# Patient Record
Sex: MALE | Race: WHITE | NOT HISPANIC OR LATINO
[De-identification: names, ages, dates, MRNs, and addresses within clinical notes are randomized per-mention and may not be internally consistent; named-entity substitution may affect disease eponyms.]

---

## 2017-08-14 ENCOUNTER — RESULT REVIEW (OUTPATIENT)
Age: 77
End: 2017-08-14

## 2019-01-27 ENCOUNTER — INPATIENT (INPATIENT)
Facility: HOSPITAL | Age: 79
LOS: 3 days | Discharge: HOME | DRG: 180 | End: 2019-01-31
Attending: FAMILY MEDICINE | Admitting: FAMILY MEDICINE
Payer: MEDICARE

## 2019-01-27 VITALS
OXYGEN SATURATION: 97 % | DIASTOLIC BLOOD PRESSURE: 60 MMHG | HEART RATE: 125 BPM | WEIGHT: 199.96 LBS | RESPIRATION RATE: 18 BRPM | TEMPERATURE: 98 F | SYSTOLIC BLOOD PRESSURE: 95 MMHG | HEIGHT: 71 IN

## 2019-01-27 DIAGNOSIS — J90 PLEURAL EFFUSION, NOT ELSEWHERE CLASSIFIED: ICD-10-CM

## 2019-01-27 DIAGNOSIS — R06.02 SHORTNESS OF BREATH: ICD-10-CM

## 2019-01-27 LAB
ALBUMIN SERPL ELPH-MCNC: 2.9 G/DL — LOW (ref 3.3–5)
ALP SERPL-CCNC: 98 U/L — SIGNIFICANT CHANGE UP (ref 40–120)
ALT FLD-CCNC: 16 U/L — SIGNIFICANT CHANGE UP (ref 12–78)
ANION GAP SERPL CALC-SCNC: 10 MMOL/L — SIGNIFICANT CHANGE UP (ref 5–17)
APTT BLD: 26.5 SEC — LOW (ref 27.5–36.3)
AST SERPL-CCNC: 13 U/L — LOW (ref 15–37)
BASOPHILS # BLD AUTO: 0.03 K/UL — SIGNIFICANT CHANGE UP (ref 0–0.2)
BASOPHILS NFR BLD AUTO: 0.2 % — SIGNIFICANT CHANGE UP (ref 0–2)
BILIRUB SERPL-MCNC: 0.5 MG/DL — SIGNIFICANT CHANGE UP (ref 0.2–1.2)
BLD GP AB SCN SERPL QL: SIGNIFICANT CHANGE UP
BUN SERPL-MCNC: 75 MG/DL — HIGH (ref 7–23)
CALCIUM SERPL-MCNC: 8.6 MG/DL — SIGNIFICANT CHANGE UP (ref 8.5–10.1)
CEA SERPL-MCNC: 24.1 NG/ML — HIGH (ref 0–3.8)
CHLORIDE SERPL-SCNC: 104 MMOL/L — SIGNIFICANT CHANGE UP (ref 96–108)
CO2 SERPL-SCNC: 23 MMOL/L — SIGNIFICANT CHANGE UP (ref 22–31)
CREAT SERPL-MCNC: 3.6 MG/DL — HIGH (ref 0.5–1.3)
EOSINOPHIL # BLD AUTO: 0.05 K/UL — SIGNIFICANT CHANGE UP (ref 0–0.5)
EOSINOPHIL NFR BLD AUTO: 0.4 % — SIGNIFICANT CHANGE UP (ref 0–6)
FLU A RESULT: SIGNIFICANT CHANGE UP
FLU A RESULT: SIGNIFICANT CHANGE UP
FLUAV AG NPH QL: SIGNIFICANT CHANGE UP
FLUBV AG NPH QL: SIGNIFICANT CHANGE UP
GLUCOSE SERPL-MCNC: 140 MG/DL — HIGH (ref 70–99)
GRAM STN FLD: SIGNIFICANT CHANGE UP
HBA1C BLD-MCNC: 5.1 % — SIGNIFICANT CHANGE UP (ref 4–5.6)
HCT VFR BLD CALC: 34.4 % — LOW (ref 39–50)
HGB BLD-MCNC: 11.4 G/DL — LOW (ref 13–17)
IMM GRANULOCYTES NFR BLD AUTO: 0.4 % — SIGNIFICANT CHANGE UP (ref 0–1.5)
INR BLD: 1.21 RATIO — HIGH (ref 0.88–1.16)
LACTATE SERPL-SCNC: 1.4 MMOL/L — SIGNIFICANT CHANGE UP (ref 0.7–2)
LACTATE SERPL-SCNC: 2.3 MMOL/L — HIGH (ref 0.7–2)
LYMPHOCYTES # BLD AUTO: 1.1 K/UL — SIGNIFICANT CHANGE UP (ref 1–3.3)
LYMPHOCYTES # BLD AUTO: 7.7 % — LOW (ref 13–44)
MCHC RBC-ENTMCNC: 30.8 PG — SIGNIFICANT CHANGE UP (ref 27–34)
MCHC RBC-ENTMCNC: 33.1 GM/DL — SIGNIFICANT CHANGE UP (ref 32–36)
MCV RBC AUTO: 93 FL — SIGNIFICANT CHANGE UP (ref 80–100)
MONOCYTES # BLD AUTO: 1.2 K/UL — HIGH (ref 0–0.9)
MONOCYTES NFR BLD AUTO: 8.5 % — SIGNIFICANT CHANGE UP (ref 2–14)
NEUTROPHILS # BLD AUTO: 11.76 K/UL — HIGH (ref 1.8–7.4)
NEUTROPHILS NFR BLD AUTO: 82.8 % — HIGH (ref 43–77)
NRBC # BLD: 0 /100 WBCS — SIGNIFICANT CHANGE UP (ref 0–0)
OB PNL STL: NEGATIVE — SIGNIFICANT CHANGE UP
PH FLD: 7.05 — SIGNIFICANT CHANGE UP
PLATELET # BLD AUTO: 368 K/UL — SIGNIFICANT CHANGE UP (ref 150–400)
POTASSIUM SERPL-MCNC: 5.1 MMOL/L — SIGNIFICANT CHANGE UP (ref 3.5–5.3)
POTASSIUM SERPL-SCNC: 5.1 MMOL/L — SIGNIFICANT CHANGE UP (ref 3.5–5.3)
PROT SERPL-MCNC: 7.4 G/DL — SIGNIFICANT CHANGE UP (ref 6–8.3)
PROTHROM AB SERPL-ACNC: 13.8 SEC — HIGH (ref 10–12.9)
RBC # BLD: 3.7 M/UL — LOW (ref 4.2–5.8)
RBC # FLD: 13.9 % — SIGNIFICANT CHANGE UP (ref 10.3–14.5)
RSV RESULT: SIGNIFICANT CHANGE UP
RSV RNA RESP QL NAA+PROBE: SIGNIFICANT CHANGE UP
SODIUM SERPL-SCNC: 137 MMOL/L — SIGNIFICANT CHANGE UP (ref 135–145)
SPECIMEN SOURCE: SIGNIFICANT CHANGE UP
T3 SERPL-MCNC: 71 NG/DL — LOW (ref 80–200)
T4 AB SER-ACNC: 4.7 UG/DL — SIGNIFICANT CHANGE UP (ref 4.6–12)
TSH SERPL-MCNC: 1.31 UIU/ML — SIGNIFICANT CHANGE UP (ref 0.36–3.74)
WBC # BLD: 14.2 K/UL — HIGH (ref 3.8–10.5)
WBC # FLD AUTO: 14.2 K/UL — HIGH (ref 3.8–10.5)

## 2019-01-27 PROCEDURE — 88108 CYTOPATH CONCENTRATE TECH: CPT | Mod: 26

## 2019-01-27 PROCEDURE — 93010 ELECTROCARDIOGRAM REPORT: CPT

## 2019-01-27 PROCEDURE — 71045 X-RAY EXAM CHEST 1 VIEW: CPT | Mod: 26

## 2019-01-27 PROCEDURE — 71045 X-RAY EXAM CHEST 1 VIEW: CPT | Mod: 26,77

## 2019-01-27 PROCEDURE — 88305 TISSUE EXAM BY PATHOLOGIST: CPT | Mod: 26

## 2019-01-27 PROCEDURE — 99285 EMERGENCY DEPT VISIT HI MDM: CPT

## 2019-01-27 PROCEDURE — 71250 CT THORAX DX C-: CPT | Mod: 26

## 2019-01-27 RX ORDER — TAMSULOSIN HYDROCHLORIDE 0.4 MG/1
0.4 CAPSULE ORAL AT BEDTIME
Qty: 0 | Refills: 0 | Status: DISCONTINUED | OUTPATIENT
Start: 2019-01-27 | End: 2019-01-31

## 2019-01-27 RX ORDER — SODIUM CHLORIDE 9 MG/ML
1000 INJECTION INTRAMUSCULAR; INTRAVENOUS; SUBCUTANEOUS ONCE
Qty: 0 | Refills: 0 | Status: COMPLETED | OUTPATIENT
Start: 2019-01-27 | End: 2019-01-27

## 2019-01-27 RX ORDER — SODIUM CHLORIDE 9 MG/ML
1000 INJECTION INTRAMUSCULAR; INTRAVENOUS; SUBCUTANEOUS
Qty: 0 | Refills: 0 | Status: DISCONTINUED | OUTPATIENT
Start: 2019-01-27 | End: 2019-01-30

## 2019-01-27 RX ORDER — TRAMADOL HYDROCHLORIDE 50 MG/1
25 TABLET ORAL THREE TIMES A DAY
Qty: 0 | Refills: 0 | Status: DISCONTINUED | OUTPATIENT
Start: 2019-01-27 | End: 2019-01-31

## 2019-01-27 RX ORDER — ALBUTEROL 90 UG/1
2 AEROSOL, METERED ORAL EVERY 6 HOURS
Qty: 0 | Refills: 0 | Status: DISCONTINUED | OUTPATIENT
Start: 2019-01-27 | End: 2019-01-31

## 2019-01-27 RX ORDER — LIDOCAINE 4 G/100G
1 CREAM TOPICAL DAILY
Qty: 0 | Refills: 0 | Status: DISCONTINUED | OUTPATIENT
Start: 2019-01-27 | End: 2019-01-31

## 2019-01-27 RX ORDER — HEPARIN SODIUM 5000 [USP'U]/ML
5000 INJECTION INTRAVENOUS; SUBCUTANEOUS EVERY 12 HOURS
Qty: 0 | Refills: 0 | Status: DISCONTINUED | OUTPATIENT
Start: 2019-01-27 | End: 2019-01-30

## 2019-01-27 RX ADMIN — SODIUM CHLORIDE 1000 MILLILITER(S): 9 INJECTION INTRAMUSCULAR; INTRAVENOUS; SUBCUTANEOUS at 18:24

## 2019-01-27 RX ADMIN — SODIUM CHLORIDE 75 MILLILITER(S): 9 INJECTION INTRAMUSCULAR; INTRAVENOUS; SUBCUTANEOUS at 20:24

## 2019-01-27 RX ADMIN — SODIUM CHLORIDE 1000 MILLILITER(S): 9 INJECTION INTRAMUSCULAR; INTRAVENOUS; SUBCUTANEOUS at 17:46

## 2019-01-27 RX ADMIN — SODIUM CHLORIDE 1000 MILLILITER(S): 9 INJECTION INTRAMUSCULAR; INTRAVENOUS; SUBCUTANEOUS at 16:46

## 2019-01-27 RX ADMIN — SODIUM CHLORIDE 1000 MILLILITER(S): 9 INJECTION INTRAMUSCULAR; INTRAVENOUS; SUBCUTANEOUS at 17:24

## 2019-01-27 RX ADMIN — TAMSULOSIN HYDROCHLORIDE 0.4 MILLIGRAM(S): 0.4 CAPSULE ORAL at 21:02

## 2019-01-27 NOTE — ED PROVIDER NOTE - OBJECTIVE STATEMENT
General
pt c/o 5 days of sob with mild nonproductive cough. pt tole by his onc that he is mildly anemic. no fevers, chills, ha, d/n/v, cp, abd pain, rectal bleeding.  pmd - patricia

## 2019-01-27 NOTE — ED ADULT NURSE NOTE - OBJECTIVE STATEMENT
a/ox4 patient a/ox4 patient sent from urgent care for dyspnea and cough x5 days. Patient tachycardic and hypotensive at this time. Monitoring patient closely on tele. Patient denies fevers, N,V,D at this time. patient recently finished meloxicam for anti-inflammatory use.

## 2019-01-27 NOTE — ED ADULT NURSE NOTE - CHIEF COMPLAINT QUOTE
Patient comes from urgent care for dyspnea, shortness of breath x 5 days associated with a dry cough. Denies fevers or chills.

## 2019-01-27 NOTE — PROCEDURE NOTE - NSPROCDETAILS_GEN_ALL_CORE
catheter inserted over needle/ultrasound assessment of effusion (size)/location identified, draped/prepped, sterile technique used, needle inserted/introduced/Seldinger technique/connection to syringe/connection to evacuated glass bottle/ultrasound assessment of effusion (localization)

## 2019-01-27 NOTE — CONSULT NOTE ADULT - SUBJECTIVE AND OBJECTIVE BOX
Date/Time Patient Seen:  		  Referring MD:   Data Reviewed	       Patient is a 78y old  Male who presents with a chief complaint of     Subjective/HPI     PAST MEDICAL & SURGICAL HISTORY:  Lung cancer  BPH (benign prostatic hyperplasia)  No significant past surgical history        Medication list         MEDICATIONS  (STANDING):  heparin  Injectable 5000 Unit(s) SubCutaneous every 12 hours  levoFLOXacin IVPB 250 milliGRAM(s) IV Intermittent every 24 hours  lidocaine   Patch 1 Patch Transdermal daily  sodium chloride 0.9%. 1000 milliLiter(s) (75 mL/Hr) IV Continuous <Continuous>  tamsulosin 0.4 milliGRAM(s) Oral at bedtime    MEDICATIONS  (PRN):  ALBUTerol    90 MICROgram(s) HFA Inhaler 2 Puff(s) Inhalation every 6 hours PRN Shortness of Breath and/or Wheezing  guaiFENesin    Syrup 100 milliGRAM(s) Oral every 6 hours PRN Cough  traMADol 25 milliGRAM(s) Oral three times a day PRN Mild Pain (1 - 3)         Vitals log        ICU Vital Signs Last 24 Hrs  T(C): 36.7 (27 Jan 2019 18:11), Max: 36.7 (27 Jan 2019 16:01)  T(F): 98.1 (27 Jan 2019 18:11), Max: 98.1 (27 Jan 2019 16:01)  HR: 102 (27 Jan 2019 18:11) (102 - 125)  BP: 117/61 (27 Jan 2019 18:11) (95/60 - 117/61)  BP(mean): --  ABP: --  ABP(mean): --  RR: 18 (27 Jan 2019 18:11) (18 - 18)  SpO2: 97% (27 Jan 2019 18:11) (97% - 97%)           Input and Output:  I&O's Detail      Lab Data                        11.4   14.20 )-----------( 368      ( 27 Jan 2019 16:48 )             34.4     01-27    137  |  104  |  75<H>  ----------------------------<  140<H>  5.1   |  23  |  3.60<H>    Ca    8.6      27 Jan 2019 16:48    TPro  7.4  /  Alb  2.9<L>  /  TBili  0.5  /  DBili  x   /  AST  13<L>  /  ALT  16  /  AlkPhos  98  01-27            Review of Systems	      Objective     Physical Examination    heart s1s2  lung dec BS  abd soft  cn grossly int  moves all extr      Pertinent Lab findings & Imaging      [place]:  NO   Adequate UO     I&O's Detail           Discussed with:     Cultures:	        Radiology    EXAM:  CT CHEST                            PROCEDURE DATE:  01/27/2019          INTERPRETATION:  Clinical information: Shortness of breath, tachycardia,   lung cancer    Axial images obtained, coronal and sagittal images computer reformatted.    Noncontrast study. No prior studies present for comparison.    A large left-sided pleural effusion is present. The left lung is   collapsed. There is mediastinal shift towards the right.    No evidence of airspace disease in the right lung. No pleural effusion. A   few linear strands are visible at the right lung base.    No pericardial effusion or thoracic aortic aneurysm. Mediastinal and   hilar regions cannot be evaluated. Central airway intact. Abnormal   appearance of the left third rib suspicious for metastatic disease.   Similar finding in the left fourth rib. Evidence of metastatic disease in     multiple lower cervical dorsal and upper lumbar vertebra.    Gallbladder contains stones. Right adrenal gland normal in appearance.    IMPRESSION: Large left-sided pleural effusion. See additional findings as   described above.                KERRI SINGLETON M.D.,ATTENDING RADIOLOGIST  This document has been electronically signed. Jan 27 2019  5:49PM

## 2019-01-27 NOTE — ED PROVIDER NOTE - CARE PLAN
Principal Discharge DX:	SOB (shortness of breath) Principal Discharge DX:	SOB (shortness of breath)  Secondary Diagnosis:	Pleural effusion

## 2019-01-27 NOTE — ED ADULT TRIAGE NOTE - CHIEF COMPLAINT QUOTE
Patient comes from urgent care for dyspnea, shortness of breath x 5 days associated with a dry cough. Denies fevers or chills. Patient comes from urgent care for dyspnea, shortness of breath x 5 days associated with a dry cough. Recently on Meloxicam for right shoulder pain. Denies fevers, chills, hematuria, or bloody stool.

## 2019-01-28 DIAGNOSIS — J91.0 MALIGNANT PLEURAL EFFUSION: ICD-10-CM

## 2019-01-28 DIAGNOSIS — Z29.9 ENCOUNTER FOR PROPHYLACTIC MEASURES, UNSPECIFIED: ICD-10-CM

## 2019-01-28 DIAGNOSIS — R06.00 DYSPNEA, UNSPECIFIED: ICD-10-CM

## 2019-01-28 DIAGNOSIS — C34.90 MALIGNANT NEOPLASM OF UNSPECIFIED PART OF UNSPECIFIED BRONCHUS OR LUNG: ICD-10-CM

## 2019-01-28 DIAGNOSIS — N40.0 BENIGN PROSTATIC HYPERPLASIA WITHOUT LOWER URINARY TRACT SYMPTOMS: ICD-10-CM

## 2019-01-28 DIAGNOSIS — N17.9 ACUTE KIDNEY FAILURE, UNSPECIFIED: ICD-10-CM

## 2019-01-28 LAB
ALBUMIN FLD-MCNC: 2.7 G/DL — SIGNIFICANT CHANGE UP
ANION GAP SERPL CALC-SCNC: 10 MMOL/L — SIGNIFICANT CHANGE UP (ref 5–17)
B PERT IGG+IGM PNL SER: SIGNIFICANT CHANGE UP
BUN SERPL-MCNC: 74 MG/DL — HIGH (ref 7–23)
CALCIUM SERPL-MCNC: 8 MG/DL — LOW (ref 8.5–10.1)
CHLORIDE SERPL-SCNC: 108 MMOL/L — SIGNIFICANT CHANGE UP (ref 96–108)
CO2 SERPL-SCNC: 23 MMOL/L — SIGNIFICANT CHANGE UP (ref 22–31)
COLOR FLD: SIGNIFICANT CHANGE UP
CREAT SERPL-MCNC: 2.6 MG/DL — HIGH (ref 0.5–1.3)
FLUID INTAKE SUBSTANCE CLASS: SIGNIFICANT CHANGE UP
FLUID SEGMENTED GRANULOCYTES: 81 % — SIGNIFICANT CHANGE UP
GLUCOSE FLD-MCNC: 16 MG/DL — SIGNIFICANT CHANGE UP
GLUCOSE SERPL-MCNC: 92 MG/DL — SIGNIFICANT CHANGE UP (ref 70–99)
HCT VFR BLD CALC: 31.5 % — LOW (ref 39–50)
HGB BLD-MCNC: 10.1 G/DL — LOW (ref 13–17)
LACTATE SERPL-SCNC: 0.9 MMOL/L — SIGNIFICANT CHANGE UP (ref 0.7–2)
LDH SERPL L TO P-CCNC: 232 U/L — SIGNIFICANT CHANGE UP (ref 50–242)
LDH SERPL L TO P-CCNC: 591 U/L — SIGNIFICANT CHANGE UP
MCHC RBC-ENTMCNC: 30.9 PG — SIGNIFICANT CHANGE UP (ref 27–34)
MCHC RBC-ENTMCNC: 32.1 GM/DL — SIGNIFICANT CHANGE UP (ref 32–36)
MCV RBC AUTO: 96.3 FL — SIGNIFICANT CHANGE UP (ref 80–100)
MONOS+MACROS # FLD: 19 % — SIGNIFICANT CHANGE UP
NIGHT BLUE STAIN TISS: SIGNIFICANT CHANGE UP
NRBC # BLD: 0 /100 WBCS — SIGNIFICANT CHANGE UP (ref 0–0)
PLATELET # BLD AUTO: 218 K/UL — SIGNIFICANT CHANGE UP (ref 150–400)
POTASSIUM SERPL-MCNC: 4.8 MMOL/L — SIGNIFICANT CHANGE UP (ref 3.5–5.3)
POTASSIUM SERPL-SCNC: 4.8 MMOL/L — SIGNIFICANT CHANGE UP (ref 3.5–5.3)
PROT FLD-MCNC: 5 G/DL — SIGNIFICANT CHANGE UP
RBC # BLD: 3.27 M/UL — LOW (ref 4.2–5.8)
RBC # FLD: 13.3 % — SIGNIFICANT CHANGE UP (ref 10.3–14.5)
RCV VOL RI: HIGH /UL (ref 0–5)
SODIUM SERPL-SCNC: 141 MMOL/L — SIGNIFICANT CHANGE UP (ref 135–145)
SPECIMEN SOURCE: SIGNIFICANT CHANGE UP
TOTAL NUCLEATED CELL COUNT, BODY FLUID: 1690 /UL — HIGH (ref 0–5)
TUBE TYPE: SIGNIFICANT CHANGE UP
WBC # BLD: 7.75 K/UL — SIGNIFICANT CHANGE UP (ref 3.8–10.5)
WBC # FLD AUTO: 7.75 K/UL — SIGNIFICANT CHANGE UP (ref 3.8–10.5)

## 2019-01-28 PROCEDURE — 32557 INSERT CATH PLEURA W/ IMAGE: CPT | Mod: LT

## 2019-01-28 PROCEDURE — 76770 US EXAM ABDO BACK WALL COMP: CPT | Mod: 26

## 2019-01-28 PROCEDURE — 93306 TTE W/DOPPLER COMPLETE: CPT | Mod: 26

## 2019-01-28 RX ORDER — INFLUENZA VIRUS VACCINE 15; 15; 15; 15 UG/.5ML; UG/.5ML; UG/.5ML; UG/.5ML
0.5 SUSPENSION INTRAMUSCULAR ONCE
Qty: 0 | Refills: 0 | Status: DISCONTINUED | OUTPATIENT
Start: 2019-01-28 | End: 2019-01-31

## 2019-01-28 RX ORDER — TAMSULOSIN HYDROCHLORIDE 0.4 MG/1
0 CAPSULE ORAL
Qty: 0 | Refills: 0 | COMMUNITY

## 2019-01-28 RX ADMIN — TAMSULOSIN HYDROCHLORIDE 0.4 MILLIGRAM(S): 0.4 CAPSULE ORAL at 21:04

## 2019-01-28 RX ADMIN — HEPARIN SODIUM 5000 UNIT(S): 5000 INJECTION INTRAVENOUS; SUBCUTANEOUS at 05:48

## 2019-01-28 RX ADMIN — SODIUM CHLORIDE 75 MILLILITER(S): 9 INJECTION INTRAMUSCULAR; INTRAVENOUS; SUBCUTANEOUS at 21:04

## 2019-01-28 RX ADMIN — HEPARIN SODIUM 5000 UNIT(S): 5000 INJECTION INTRAVENOUS; SUBCUTANEOUS at 19:30

## 2019-01-28 NOTE — PATIENT PROFILE ADULT - VISION (WITH CORRECTIVE LENSES IF THE PATIENT USUALLY WEARS THEM):
Partially impaired: cannot see medication labels or newsprint, but can see obstacles in path, and the surrounding layout; can count fingers at arm's length
baseline

## 2019-01-28 NOTE — CONSULT NOTE ADULT - PROBLEM SELECTOR RECOMMENDATION 9
pleural eff - lung ca - on tagrisso - as per heme onc PMD  thoracentesis performed in ED - 3 L drained - bloody eff  I bunny  tolerating room air  keep sat > 88 pct  fluid sent for analysis - suspicious for Malignant Pleural Effusion  on emp ABX  supportive medical regimen  tylenol for pain, discomfort PRN  will follow  discussed with patient
Do not suspect this is being driven by an acute infectious process so recommend observation off abx

## 2019-01-28 NOTE — CONSULT NOTE ADULT - SUBJECTIVE AND OBJECTIVE BOX
Patient is a 78y old  Male who presents with a chief complaint of Dyspnea (28 Jan 2019 07:59)    HPI:      PAST MEDICAL HISTORY:  Lung cancer  BPH (benign prostatic hyperplasia)      PAST SURGICAL HISTORY:  No significant past surgical history      FAMILY HISTORY:      SOCIAL HISTORY:    Allergies    erythromycin (Other)    Intolerances      Home Medications:  Lotensin HCT 20 mg-12.5 mg oral tablet: 1 tab(s) orally once a day (27 Jan 2019 18:00)  Tagrisso 80 mg oral tablet:  (27 Jan 2019 18:00)  tamsulosin 0.4 mg oral capsule: orally 2 times a day (27 Jan 2019 18:00)    MEDICATIONS  (STANDING):  heparin  Injectable 5000 Unit(s) SubCutaneous every 12 hours  levoFLOXacin IVPB 250 milliGRAM(s) IV Intermittent every 24 hours  lidocaine   Patch 1 Patch Transdermal daily  sodium chloride 0.9%. 1000 milliLiter(s) (75 mL/Hr) IV Continuous <Continuous>  tamsulosin 0.4 milliGRAM(s) Oral at bedtime    MEDICATIONS  (PRN):  ALBUTerol    90 MICROgram(s) HFA Inhaler 2 Puff(s) Inhalation every 6 hours PRN Shortness of Breath and/or Wheezing  guaiFENesin    Syrup 100 milliGRAM(s) Oral every 6 hours PRN Cough  traMADol 25 milliGRAM(s) Oral three times a day PRN Mild Pain (1 - 3)      REVIEW OF SYSTEMS:  General:   Respiratory: No cough, SOB  Cardiovascular: No CP or Palpitations	  Gastrointestinal: No nausea, Vomiting. No diarrhea  Genitourinary: No urinary complaints	  Musculoskeletal: No leg swelling, No new rash or lesions	  Neurological: 	  all other systems negative    T(F): 98 (01-28-19 @ 08:03), Max: 98.6 (01-28-19 @ 05:38)  HR: 87 (01-28-19 @ 08:03) (82 - 125)  BP: 123/63 (01-28-19 @ 08:03) (95/60 - 128/51)  RR: 17 (01-28-19 @ 08:03) (17 - 19)  SpO2: 98% (01-28-19 @ 08:03) (93% - 98%)  Wt(kg): --    PHYSICAL EXAM:  General: NAD  Respiratory: b/l air entry  Cardiovascular: S1 S2  Gastrointestinal: soft  Extremities: edema        01-28    141  |  108  |  74<H>  ----------------------------<  92  4.8   |  23  |  2.60<H>    Ca    8.0<L>      28 Jan 2019 05:19    TPro  7.4  /  Alb  2.9<L>  /  TBili  0.5  /  DBili  x   /  AST  13<L>  /  ALT  16  /  AlkPhos  98  01-27                          10.1   7.75  )-----------( 218      ( 28 Jan 2019 05:19 )             31.5       Hemoglobin: 10.1 g/dL (01-28 @ 05:19)  Hematocrit: 31.5 % (01-28 @ 05:19)  Potassium, Serum: 4.8 mmol/L (01-28 @ 05:19)  Blood Urea Nitrogen, Serum: 74 mg/dL (01-28 @ 05:19)      Creatinine, Serum: 2.60 (01-28 @ 05:19)  Creatinine, Serum: 3.60 (01-27 @ 16:48)        LIVER FUNCTIONS - ( 27 Jan 2019 16:48 )  Alb: 2.9 g/dL / Pro: 7.4 g/dL / ALK PHOS: 98 U/L / ALT: 16 U/L / AST: 13 U/L / GGT: x                       I&O's Detail    27 Jan 2019 07:01  -  28 Jan 2019 07:00  --------------------------------------------------------  IN:  Total IN: 0 mL    OUT:    Voided: 450 mL  Total OUT: 450 mL    Total NET: -450 mL            Culture - Body Fluid with Gram Stain (collected 27 Jan 2019 22:54)  Source: Pleural Fl Pleural Fluid  Gram Stain (27 Jan 2019 23:42):    polymorphonuclear leukocytes seen    No organisms seen    by cytocentrifuge Patient is a 78y old  Male who presents with a chief complaint of Dyspnea (28 Jan 2019 07:59)    HPI:   79 yo male with history of 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 He reportedly saw Dr. Duckworth about a month ago and presents now to [facility] ER with 5 day history of acute dyspnea.  In ER he underwent left thoracentesis with drainage of about 3000cc fluid. Clinically he is much improved.     Renal consult called for FAINA. Pt denies any history of kidney disease. History obtained from chart and patient. Pt seen in IR. Pt's wife at bedside.       PAST MEDICAL HISTORY:  Lung cancer  BPH (benign prostatic hyperplasia)      PAST SURGICAL HISTORY:  No significant past surgical history      FAMILY HISTORY:      SOCIAL HISTORY: + alcohol intake     Allergies    erythromycin (Other)    Intolerances      Home Medications:  Lotensin HCT 20 mg-12.5 mg oral tablet: 1 tab(s) orally once a day (27 Jan 2019 18:00)  Tagrisso 80 mg oral tablet:  (27 Jan 2019 18:00)  tamsulosin 0.4 mg oral capsule: orally 2 times a day (27 Jan 2019 18:00)    MEDICATIONS  (STANDING):  heparin  Injectable 5000 Unit(s) SubCutaneous every 12 hours  levoFLOXacin IVPB 250 milliGRAM(s) IV Intermittent every 24 hours  lidocaine   Patch 1 Patch Transdermal daily  sodium chloride 0.9%. 1000 milliLiter(s) (75 mL/Hr) IV Continuous <Continuous>  tamsulosin 0.4 milliGRAM(s) Oral at bedtime    MEDICATIONS  (PRN):  ALBUTerol    90 MICROgram(s) HFA Inhaler 2 Puff(s) Inhalation every 6 hours PRN Shortness of Breath and/or Wheezing  guaiFENesin    Syrup 100 milliGRAM(s) Oral every 6 hours PRN Cough  traMADol 25 milliGRAM(s) Oral three times a day PRN Mild Pain (1 - 3)      REVIEW OF SYSTEMS:  General: NAD  Respiratory: + SOB  Cardiovascular: No CP or Palpitations	  Gastrointestinal: Poor appetite   Genitourinary: No urinary complaints	  Musculoskeletal: No new rash or lesions		  all other systems negative    T(F): 98 (01-28-19 @ 08:03), Max: 98.6 (01-28-19 @ 05:38)  HR: 87 (01-28-19 @ 08:03) (82 - 125)  BP: 123/63 (01-28-19 @ 08:03) (95/60 - 128/51)  RR: 17 (01-28-19 @ 08:03) (17 - 19)  SpO2: 98% (01-28-19 @ 08:03) (93% - 98%)  Wt(kg): --    PHYSICAL EXAM:  General: NAD  Respiratory: b/l air entry  Cardiovascular: S1 S2  Gastrointestinal: soft  Extremities: no edema        01-28    141  |  108  |  74<H>  ----------------------------<  92  4.8   |  23  |  2.60<H>    Ca    8.0<L>      28 Jan 2019 05:19    TPro  7.4  /  Alb  2.9<L>  /  TBili  0.5  /  DBili  x   /  AST  13<L>  /  ALT  16  /  AlkPhos  98  01-27                          10.1   7.75  )-----------( 218      ( 28 Jan 2019 05:19 )             31.5       Hemoglobin: 10.1 g/dL (01-28 @ 05:19)  Hematocrit: 31.5 % (01-28 @ 05:19)  Potassium, Serum: 4.8 mmol/L (01-28 @ 05:19)  Blood Urea Nitrogen, Serum: 74 mg/dL (01-28 @ 05:19)      Creatinine, Serum: 2.60 (01-28 @ 05:19)  Creatinine, Serum: 3.60 (01-27 @ 16:48)        LIVER FUNCTIONS - ( 27 Jan 2019 16:48 )  Alb: 2.9 g/dL / Pro: 7.4 g/dL / ALK PHOS: 98 U/L / ALT: 16 U/L / AST: 13 U/L / GGT: x           I&O's Detail    27 Jan 2019 07:01  -  28 Jan 2019 07:00  --------------------------------------------------------  IN:  Total IN: 0 mL    OUT:    Voided: 450 mL  Total OUT: 450 mL    Total NET: -450 mL            Culture - Body Fluid with Gram Stain (collected 27 Jan 2019 22:54)  Source: Pleural Fl Pleural Fluid  Gram Stain (27 Jan 2019 23:42):    polymorphonuclear leukocytes seen    No organisms seen    by cytocentrifuge    < from: US Kidney and Bladder (01.28.19 @ 10:06) >  EXAM:  US KIDNEYS AND BLADDER                            PROCEDURE DATE:  01/28/2019          INTERPRETATION:  History: Acute kidney injury.    Bilateral renal ultrasound.   Left kidney 11.2 right 10.2 cm. Normal cortical volume echotexture for   age. No hydronephrosis shadowing calculus or solid/suspicious renal mass.   There are 2 simple cortical cysts in the left kidney measuring 4.0 cm,   and 0.9 cm respectively.  Bladder distended up to 200 cc with a post void residual of 71 cc. No   bladder wall thickening or abnormal intraluminal echoes.    Impression: No post renal obstruction.      JOSE JUAN CORREA M.D., ATTENDING RADIOLOGIST  This document has been electronically signed. Jan 28 2019 10:35AM    < end of copied text >

## 2019-01-28 NOTE — H&P ADULT - HISTORY OF PRESENT ILLNESS
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 He reportedly saw Dr. Duckworth about a month ago and presents now to [facility] ER with 5 day history of acute dyspnea.  In ER he underwent left thoracentesis with drainage of about 3000cc fluid. Clinically he is much improved.

## 2019-01-28 NOTE — BRIEF OPERATIVE NOTE - PROCEDURE
<<-----Click on this checkbox to enter Procedure Thoracentesis of left pleural cavity with fluoroscopic guidance  01/28/2019    Active  WFORMAN

## 2019-01-28 NOTE — CONSULT NOTE ADULT - SUBJECTIVE AND OBJECTIVE BOX
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 He reportedly saw Dr. Duckworth about a month ago and presents now to [facility] ER with 5 day history of acute dyspnea. Patient reports no fever, no chills, no productive cough.    In ER he underwent left thoracentesis with drainage of about 3000cc fluid. Clinically he is much improved. (28 Jan 2019 11:38)      PAST MEDICAL & SURGICAL HISTORY:  Lung cancer  BPH (benign prostatic hyperplasia)  No significant past surgical history      Antimicrobials  levoFLOXacin IVPB 250 milliGRAM(s) IV Intermittent every 24 hours      Immunological      Other  ALBUTerol    90 MICROgram(s) HFA Inhaler 2 Puff(s) Inhalation every 6 hours PRN  guaiFENesin    Syrup 100 milliGRAM(s) Oral every 6 hours PRN  heparin  Injectable 5000 Unit(s) SubCutaneous every 12 hours  lidocaine   Patch 1 Patch Transdermal daily  sodium chloride 0.9%. 1000 milliLiter(s) IV Continuous <Continuous>  tamsulosin 0.4 milliGRAM(s) Oral at bedtime  traMADol 25 milliGRAM(s) Oral three times a day PRN      Allergies    erythromycin (Other)    Intolerances    SOCIAL HISTORY: prior tobacco use    FAMILY HISTORY: noncontrib      ROS:    EYES:  Negative  blurry vision or double vision  GASTROINTESTINAL:  Negative for nausea, vomiting, diarrhea  -otherwise negative except for subjective    Vital Signs Last 24 Hrs  T(C): 36.7 (28 Jan 2019 08:03), Max: 37 (28 Jan 2019 05:38)  T(F): 98 (28 Jan 2019 08:03), Max: 98.6 (28 Jan 2019 05:38)  HR: 87 (28 Jan 2019 08:03) (82 - 125)  BP: 123/63 (28 Jan 2019 08:03) (95/60 - 128/51)  BP(mean): --  RR: 17 (28 Jan 2019 08:03) (17 - 19)  SpO2: 98% (28 Jan 2019 08:03) (93% - 98%)    PE:  WDWN in no distress  HEENT:  NC, PERRL, sclerae anicteric, conjunctivae clear, EOMI.  Sinuses nontender, no nasal exudate.  No buccal or pharyngeal lesions, erythema or exudate  Neck:  Supple, no adenopathy  Lungs:  No accessory muscle use, decreased BS in left base  Cor:  RRR, S1, S2, no murmur appreciated  Abd:  Symmetric, normoactive BS.  Soft, nontender, no masses, guarding or rebound.  Liver and spleen not enlarged  Extrem:  No cyanosis or edema  Skin:  No rashes.  Neuro: grossly intact  Musc: moving all limbs freely, no focal deficits    LABS:                        10.1   7.75  )-----------( 218      ( 28 Jan 2019 05:19 )             31.5       WBC Count: 7.75 K/uL (01-28-19 @ 05:19)  WBC Count: 14.20 K/uL (01-27-19 @ 16:48)      01-28    141  |  108  |  74<H>  ----------------------------<  92  4.8   |  23  |  2.60<H>    Ca    8.0<L>      28 Jan 2019 05:19    TPro  7.4  /  Alb  2.9<L>  /  TBili  0.5  /  DBili  x   /  AST  13<L>  /  ALT  16  /  AlkPhos  98  01-27      Creatinine, Serum: 2.60 mg/dL (01-28-19 @ 05:19)  Creatinine, Serum: 3.60 mg/dL (01-27-19 @ 16:48)    MICROBIOLOGY:      RADIOLOGY & ADDITIONAL STUDIES:    --< from: CT Chest No Cont (01.27.19 @ 17:36) >    EXAM:  CT CHEST                            PROCEDURE DATE:  01/27/2019          INTERPRETATION:  Clinical information: Shortness of breath, tachycardia,   lung cancer    Axial images obtained, coronal and sagittal images computer reformatted.    Noncontrast study. No prior studies present for comparison.    A large left-sided pleural effusion is present. The left lung is   collapsed. There is mediastinal shift towards the right.    No evidence of airspace disease in the right lung. No pleural effusion. A   few linear strands are visible at the right lung base.    No pericardial effusion or thoracic aortic aneurysm. Mediastinal and   hilar regions cannot be evaluated. Central airway intact. Abnormal   appearance of the left third rib suspicious for metastatic disease.   Similar finding in the left fourth rib. Evidence of metastatic disease in     multiple lower cervical dorsal and upper lumbar vertebra.    Gallbladder contains stones. Right adrenal gland normal in appearance.    IMPRESSION: Large left-sided pleural effusion. See additional findings as   described above.

## 2019-01-28 NOTE — CONSULT NOTE ADULT - SUBJECTIVE AND OBJECTIVE BOX
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 He reportedly saw Dr. Duckworth about a month ago and presents now to [facility] ER with 5 day history of acute dyspnea.  In ER he underwent left thoracentesis with drainage of about 3000cc fluid. Clinically he is much improved.   Asked to evaluate for lung cancer.    ROS:  Negative except for: dyspnea improved; cough resolved; pain in right hand/elboe due to bursitis    PAST MEDICAL & SURGICAL HISTORY:  Lung cancer  BPH (benign prostatic hyperplasia)    SOCIAL HISTORY:  former smoker; quit 1980's;  Drinks wine rarely; about once per week  retired; used to drive  Truck    FAMILY HISTORY:  non-contributory    MEDICATIONS  (STANDING):  heparin  Injectable 5000 Unit(s) SubCutaneous every 12 hours  levoFLOXacin IVPB 250 milliGRAM(s) IV Intermittent every 24 hours  lidocaine   Patch 1 Patch Transdermal daily  sodium chloride 0.9%. 1000 milliLiter(s) (75 mL/Hr) IV Continuous <Continuous>  tamsulosin 0.4 milliGRAM(s) Oral at bedtime    MEDICATIONS  (PRN):  ALBUTerol    90 MICROgram(s) HFA Inhaler 2 Puff(s) Inhalation every 6 hours PRN Shortness of Breath and/or Wheezing  guaiFENesin    Syrup 100 milliGRAM(s) Oral every 6 hours PRN Cough  traMADol 25 milliGRAM(s) Oral three times a day PRN Mild Pain (1 - 3)      Allergies: erythromycin (Other)      Vital Signs Last 24 Hrs  T(C): 36.7 (28 Jan 2019 08:03), Max: 37 (28 Jan 2019 05:38)  T(F): 98 (28 Jan 2019 08:03), Max: 98.6 (28 Jan 2019 05:38)  HR: 87 (28 Jan 2019 08:03) (82 - 125)  BP: 123/63 (28 Jan 2019 08:03) (95/60 - 128/51)  RR: 17 (28 Jan 2019 08:03) (17 - 19)  SpO2: 98% (28 Jan 2019 08:03) (93% - 98%)    PHYSICAL EXAM  General: adult in NAD  HEENT: clear oropharynx, anicteric sclera, pink conjunctivae  Neck: supple  CV: normal S1S2 with no murmur rubs or gallops  Lungs: reduced breath sounds at bilateral bases  Abdomen: soft non-tender non-distended, no hepato/splenomegaly  Ext: + stiffness and swelling in right wrist/elbow  Skin: no rashes and no petichiae  Neuro: alert and oriented X3 no focal deficits      LABS:    CBC Full  -  ( 28 Jan 2019 05:19 )  WBC Count : 7.75 K/uL  Hemoglobin : 10.1 g/dL  Hematocrit : 31.5 %  Platelet Count - Automated : 218 K/uL  Mean Cell Volume : 96.3 fl  Mean Cell Hemoglobin : 30.9 pg  Mean Cell Hemoglobin Concentration : 32.1 gm/dL    Hemoglobin: 10.1 g/dL (01-28 @ 05:19)  Hemoglobin: 11.4 g/dL (01-27 @ 16:48)    WBC Count: 7.75 K/uL (01-28 @ 05:19)  WBC Count: 14.20 K/uL (01-27 @ 16:48)      01-28    141  |  108  |  74<H>  ----------------------------<  92  4.8   |  23  |  2.60<H>    Ca    8.0<L>      28 Jan 2019 05:19    TPro  7.4  /  Alb  2.9<L>  /  TBili  0.5  /  DBili  x   /  AST  13<L>  /  ALT  16  /  AlkPhos  98  01-27    PT/INR - ( 27 Jan 2019 16:48 )   PT: 13.8 sec;   INR: 1.21 ratio    PTT - ( 27 Jan 2019 16:48 )  PTT:26.5 sec    RADIOLOGY :  < from: CT Chest No Cont (01.27.19 @ 17:36) >  INTERPRETATION:  Clinical information: Shortness of breath, tachycardia,   lung cancer    Axial images obtained, coronal and sagittal images computer reformatted.    Noncontrast study. No prior studies present for comparison.    A large left-sided pleural effusion is present. The left lung is   collapsed. There is mediastinal shift towards the right.    No evidence of airspace disease in the right lung. No pleural effusion. A   few linear strands are visible at the right lung base.    No pericardial effusion or thoracic aortic aneurysm. Mediastinal and   hilar regions cannot be evaluated. Central airway intact. Abnormal   appearance of the left third rib suspicious for metastatic disease.   Similar finding in the left fourth rib. Evidence of metastatic disease in     multiple lower cervical dorsal and upper lumbar vertebra.    Gallbladder contains stones. Right adrenal gland normal in appearance.    IMPRESSION: Large left-sided pleural effusion. See additional findings as   described above.    < end of copied text >

## 2019-01-28 NOTE — CONSULT NOTE ADULT - PROBLEM SELECTOR RECOMMENDATION 3
per oncology    Thank you for consulting us and involving us in the management of this most interesting and challenging case.     We will follow along in the care of this patient.

## 2019-01-29 LAB
ANION GAP SERPL CALC-SCNC: 8 MMOL/L — SIGNIFICANT CHANGE UP (ref 5–17)
BUN SERPL-MCNC: 53 MG/DL — HIGH (ref 7–23)
CALCIUM SERPL-MCNC: 8.2 MG/DL — LOW (ref 8.5–10.1)
CHLORIDE SERPL-SCNC: 109 MMOL/L — HIGH (ref 96–108)
CO2 SERPL-SCNC: 24 MMOL/L — SIGNIFICANT CHANGE UP (ref 22–31)
CREAT SERPL-MCNC: 1.7 MG/DL — HIGH (ref 0.5–1.3)
GLUCOSE SERPL-MCNC: 95 MG/DL — SIGNIFICANT CHANGE UP (ref 70–99)
HCT VFR BLD CALC: 30.2 % — LOW (ref 39–50)
HGB BLD-MCNC: 9.8 G/DL — LOW (ref 13–17)
MAGNESIUM SERPL-MCNC: 2.2 MG/DL — SIGNIFICANT CHANGE UP (ref 1.6–2.6)
MCHC RBC-ENTMCNC: 30.2 PG — SIGNIFICANT CHANGE UP (ref 27–34)
MCHC RBC-ENTMCNC: 32.5 GM/DL — SIGNIFICANT CHANGE UP (ref 32–36)
MCV RBC AUTO: 92.9 FL — SIGNIFICANT CHANGE UP (ref 80–100)
NRBC # BLD: 0 /100 WBCS — SIGNIFICANT CHANGE UP (ref 0–0)
PLATELET # BLD AUTO: 231 K/UL — SIGNIFICANT CHANGE UP (ref 150–400)
POTASSIUM SERPL-MCNC: 4.7 MMOL/L — SIGNIFICANT CHANGE UP (ref 3.5–5.3)
POTASSIUM SERPL-SCNC: 4.7 MMOL/L — SIGNIFICANT CHANGE UP (ref 3.5–5.3)
RBC # BLD: 3.25 M/UL — LOW (ref 4.2–5.8)
RBC # FLD: 13.6 % — SIGNIFICANT CHANGE UP (ref 10.3–14.5)
SODIUM SERPL-SCNC: 141 MMOL/L — SIGNIFICANT CHANGE UP (ref 135–145)
WBC # BLD: 8.03 K/UL — SIGNIFICANT CHANGE UP (ref 3.8–10.5)
WBC # FLD AUTO: 8.03 K/UL — SIGNIFICANT CHANGE UP (ref 3.8–10.5)

## 2019-01-29 PROCEDURE — 78306 BONE IMAGING WHOLE BODY: CPT | Mod: 26

## 2019-01-29 RX ADMIN — HEPARIN SODIUM 5000 UNIT(S): 5000 INJECTION INTRAVENOUS; SUBCUTANEOUS at 05:08

## 2019-01-29 RX ADMIN — HEPARIN SODIUM 5000 UNIT(S): 5000 INJECTION INTRAVENOUS; SUBCUTANEOUS at 18:45

## 2019-01-29 RX ADMIN — TAMSULOSIN HYDROCHLORIDE 0.4 MILLIGRAM(S): 0.4 CAPSULE ORAL at 21:09

## 2019-01-29 RX ADMIN — SODIUM CHLORIDE 75 MILLILITER(S): 9 INJECTION INTRAMUSCULAR; INTRAVENOUS; SUBCUTANEOUS at 18:45

## 2019-01-29 NOTE — GOALS OF CARE CONVERSATION - PERSONAL ADVANCE DIRECTIVE - CONVERSATION DETAILS
Met pt, feels better since placement of chest catheter. Unsure if he has hcp, await wife to visit, will consider completing hcp. contact # given

## 2019-01-30 LAB
ANION GAP SERPL CALC-SCNC: 7 MMOL/L — SIGNIFICANT CHANGE UP (ref 5–17)
BUN SERPL-MCNC: 38 MG/DL — HIGH (ref 7–23)
CALCIUM SERPL-MCNC: 8.2 MG/DL — LOW (ref 8.5–10.1)
CHLORIDE SERPL-SCNC: 108 MMOL/L — SIGNIFICANT CHANGE UP (ref 96–108)
CO2 SERPL-SCNC: 25 MMOL/L — SIGNIFICANT CHANGE UP (ref 22–31)
CREAT SERPL-MCNC: 1.5 MG/DL — HIGH (ref 0.5–1.3)
GLUCOSE SERPL-MCNC: 98 MG/DL — SIGNIFICANT CHANGE UP (ref 70–99)
HCT VFR BLD CALC: 29.1 % — LOW (ref 39–50)
HGB BLD-MCNC: 9.7 G/DL — LOW (ref 13–17)
MAGNESIUM SERPL-MCNC: 2 MG/DL — SIGNIFICANT CHANGE UP (ref 1.6–2.6)
MCHC RBC-ENTMCNC: 30.7 PG — SIGNIFICANT CHANGE UP (ref 27–34)
MCHC RBC-ENTMCNC: 33.3 GM/DL — SIGNIFICANT CHANGE UP (ref 32–36)
MCV RBC AUTO: 92.1 FL — SIGNIFICANT CHANGE UP (ref 80–100)
NRBC # BLD: 0 /100 WBCS — SIGNIFICANT CHANGE UP (ref 0–0)
PLATELET # BLD AUTO: 229 K/UL — SIGNIFICANT CHANGE UP (ref 150–400)
POTASSIUM SERPL-MCNC: 4.4 MMOL/L — SIGNIFICANT CHANGE UP (ref 3.5–5.3)
POTASSIUM SERPL-SCNC: 4.4 MMOL/L — SIGNIFICANT CHANGE UP (ref 3.5–5.3)
RBC # BLD: 3.16 M/UL — LOW (ref 4.2–5.8)
RBC # FLD: 13.2 % — SIGNIFICANT CHANGE UP (ref 10.3–14.5)
SODIUM SERPL-SCNC: 140 MMOL/L — SIGNIFICANT CHANGE UP (ref 135–145)
WBC # BLD: 7.51 K/UL — SIGNIFICANT CHANGE UP (ref 3.8–10.5)
WBC # FLD AUTO: 7.51 K/UL — SIGNIFICANT CHANGE UP (ref 3.8–10.5)

## 2019-01-30 PROCEDURE — 73552 X-RAY EXAM OF FEMUR 2/>: CPT | Mod: 26,50

## 2019-01-30 PROCEDURE — 71046 X-RAY EXAM CHEST 2 VIEWS: CPT | Mod: 26

## 2019-01-30 RX ORDER — BENAZEPRIL HYDROCHLORIDE AND HYDROCHLOROTHIAZIDE 10; 12.5 MG/1; MG/1
1 TABLET, FILM COATED ORAL
Qty: 0 | Refills: 0 | COMMUNITY

## 2019-01-30 RX ORDER — ALBUTEROL 90 UG/1
2 AEROSOL, METERED ORAL
Qty: 0 | Refills: 0 | COMMUNITY
Start: 2019-01-30

## 2019-01-30 RX ORDER — HEPARIN SODIUM 5000 [USP'U]/ML
5000 INJECTION INTRAVENOUS; SUBCUTANEOUS EVERY 8 HOURS
Qty: 0 | Refills: 0 | Status: DISCONTINUED | OUTPATIENT
Start: 2019-01-30 | End: 2019-01-31

## 2019-01-30 RX ORDER — OSIMERTINIB 80 1/1
0 TABLET, FILM COATED ORAL
Qty: 0 | Refills: 0 | COMMUNITY

## 2019-01-30 RX ADMIN — HEPARIN SODIUM 5000 UNIT(S): 5000 INJECTION INTRAVENOUS; SUBCUTANEOUS at 05:27

## 2019-01-30 RX ADMIN — TAMSULOSIN HYDROCHLORIDE 0.4 MILLIGRAM(S): 0.4 CAPSULE ORAL at 21:30

## 2019-01-30 RX ADMIN — LIDOCAINE 1 PATCH: 4 CREAM TOPICAL at 19:26

## 2019-01-30 RX ADMIN — HEPARIN SODIUM 5000 UNIT(S): 5000 INJECTION INTRAVENOUS; SUBCUTANEOUS at 17:57

## 2019-01-30 RX ADMIN — HEPARIN SODIUM 5000 UNIT(S): 5000 INJECTION INTRAVENOUS; SUBCUTANEOUS at 21:30

## 2019-01-30 RX ADMIN — LIDOCAINE 1 PATCH: 4 CREAM TOPICAL at 12:36

## 2019-01-30 NOTE — PROGRESS NOTE ADULT - ATTENDING COMMENTS
D/W patient and his wife.    Thank you for the courtesy of this referral.    I'll sign off at this time.     Fady Purdy MD  [phone]

## 2019-01-31 ENCOUNTER — TRANSCRIPTION ENCOUNTER (OUTPATIENT)
Age: 79
End: 2019-01-31

## 2019-01-31 VITALS
SYSTOLIC BLOOD PRESSURE: 127 MMHG | HEART RATE: 109 BPM | RESPIRATION RATE: 17 BRPM | OXYGEN SATURATION: 96 % | DIASTOLIC BLOOD PRESSURE: 61 MMHG | TEMPERATURE: 98 F

## 2019-01-31 LAB
ANION GAP SERPL CALC-SCNC: 8 MMOL/L — SIGNIFICANT CHANGE UP (ref 5–17)
BUN SERPL-MCNC: 36 MG/DL — HIGH (ref 7–23)
CALCIUM SERPL-MCNC: 8.4 MG/DL — LOW (ref 8.5–10.1)
CHLORIDE SERPL-SCNC: 106 MMOL/L — SIGNIFICANT CHANGE UP (ref 96–108)
CO2 SERPL-SCNC: 26 MMOL/L — SIGNIFICANT CHANGE UP (ref 22–31)
CREAT SERPL-MCNC: 1.4 MG/DL — HIGH (ref 0.5–1.3)
GLUCOSE SERPL-MCNC: 98 MG/DL — SIGNIFICANT CHANGE UP (ref 70–99)
POTASSIUM SERPL-MCNC: 4.1 MMOL/L — SIGNIFICANT CHANGE UP (ref 3.5–5.3)
POTASSIUM SERPL-SCNC: 4.1 MMOL/L — SIGNIFICANT CHANGE UP (ref 3.5–5.3)
SODIUM SERPL-SCNC: 140 MMOL/L — SIGNIFICANT CHANGE UP (ref 135–145)

## 2019-01-31 PROCEDURE — 93005 ELECTROCARDIOGRAM TRACING: CPT

## 2019-01-31 PROCEDURE — 82962 GLUCOSE BLOOD TEST: CPT

## 2019-01-31 PROCEDURE — 84443 ASSAY THYROID STIM HORMONE: CPT

## 2019-01-31 PROCEDURE — 82042 OTHER SOURCE ALBUMIN QUAN EA: CPT

## 2019-01-31 PROCEDURE — 87116 MYCOBACTERIA CULTURE: CPT

## 2019-01-31 PROCEDURE — 73552 X-RAY EXAM OF FEMUR 2/>: CPT

## 2019-01-31 PROCEDURE — C1729: CPT

## 2019-01-31 PROCEDURE — 76770 US EXAM ABDO BACK WALL COMP: CPT

## 2019-01-31 PROCEDURE — 80053 COMPREHEN METABOLIC PANEL: CPT

## 2019-01-31 PROCEDURE — 32557 INSERT CATH PLEURA W/ IMAGE: CPT

## 2019-01-31 PROCEDURE — 84480 ASSAY TRIIODOTHYRONINE (T3): CPT

## 2019-01-31 PROCEDURE — 89051 BODY FLUID CELL COUNT: CPT

## 2019-01-31 PROCEDURE — 87206 SMEAR FLUORESCENT/ACID STAI: CPT

## 2019-01-31 PROCEDURE — 71046 X-RAY EXAM CHEST 2 VIEWS: CPT

## 2019-01-31 PROCEDURE — 78306 BONE IMAGING WHOLE BODY: CPT

## 2019-01-31 PROCEDURE — 85027 COMPLETE CBC AUTOMATED: CPT

## 2019-01-31 PROCEDURE — 86900 BLOOD TYPING SEROLOGIC ABO: CPT

## 2019-01-31 PROCEDURE — 83605 ASSAY OF LACTIC ACID: CPT

## 2019-01-31 PROCEDURE — 99285 EMERGENCY DEPT VISIT HI MDM: CPT | Mod: 25

## 2019-01-31 PROCEDURE — 88108 CYTOPATH CONCENTRATE TECH: CPT

## 2019-01-31 PROCEDURE — 84157 ASSAY OF PROTEIN OTHER: CPT

## 2019-01-31 PROCEDURE — 87040 BLOOD CULTURE FOR BACTERIA: CPT

## 2019-01-31 PROCEDURE — 83036 HEMOGLOBIN GLYCOSYLATED A1C: CPT

## 2019-01-31 PROCEDURE — 86901 BLOOD TYPING SEROLOGIC RH(D): CPT

## 2019-01-31 PROCEDURE — 88305 TISSUE EXAM BY PATHOLOGIST: CPT

## 2019-01-31 PROCEDURE — 87631 RESP VIRUS 3-5 TARGETS: CPT

## 2019-01-31 PROCEDURE — C1894: CPT

## 2019-01-31 PROCEDURE — 83615 LACTATE (LD) (LDH) ENZYME: CPT

## 2019-01-31 PROCEDURE — 87102 FUNGUS ISOLATION CULTURE: CPT

## 2019-01-31 PROCEDURE — C1769: CPT

## 2019-01-31 PROCEDURE — 84436 ASSAY OF TOTAL THYROXINE: CPT

## 2019-01-31 PROCEDURE — 87015 SPECIMEN INFECT AGNT CONCNTJ: CPT

## 2019-01-31 PROCEDURE — 85610 PROTHROMBIN TIME: CPT

## 2019-01-31 PROCEDURE — 76000 FLUOROSCOPY <1 HR PHYS/QHP: CPT

## 2019-01-31 PROCEDURE — 83986 ASSAY PH BODY FLUID NOS: CPT

## 2019-01-31 PROCEDURE — 71250 CT THORAX DX C-: CPT

## 2019-01-31 PROCEDURE — 82272 OCCULT BLD FECES 1-3 TESTS: CPT

## 2019-01-31 PROCEDURE — 87205 SMEAR GRAM STAIN: CPT

## 2019-01-31 PROCEDURE — 36415 COLL VENOUS BLD VENIPUNCTURE: CPT

## 2019-01-31 PROCEDURE — 82378 CARCINOEMBRYONIC ANTIGEN: CPT

## 2019-01-31 PROCEDURE — 85730 THROMBOPLASTIN TIME PARTIAL: CPT

## 2019-01-31 PROCEDURE — 86850 RBC ANTIBODY SCREEN: CPT

## 2019-01-31 PROCEDURE — 83735 ASSAY OF MAGNESIUM: CPT

## 2019-01-31 PROCEDURE — 93306 TTE W/DOPPLER COMPLETE: CPT

## 2019-01-31 PROCEDURE — 71045 X-RAY EXAM CHEST 1 VIEW: CPT

## 2019-01-31 PROCEDURE — 82945 GLUCOSE OTHER FLUID: CPT

## 2019-01-31 PROCEDURE — 76942 ECHO GUIDE FOR BIOPSY: CPT

## 2019-01-31 PROCEDURE — 87070 CULTURE OTHR SPECIMN AEROBIC: CPT

## 2019-01-31 PROCEDURE — 80048 BASIC METABOLIC PNL TOTAL CA: CPT

## 2019-01-31 PROCEDURE — 84145 PROCALCITONIN (PCT): CPT

## 2019-01-31 PROCEDURE — A9561: CPT

## 2019-01-31 PROCEDURE — 87075 CULTR BACTERIA EXCEPT BLOOD: CPT

## 2019-01-31 RX ORDER — ALBUTEROL 90 UG/1
2 AEROSOL, METERED ORAL
Qty: 1 | Refills: 0 | OUTPATIENT
Start: 2019-01-31

## 2019-01-31 RX ORDER — LIDOCAINE 4 G/100G
1 CREAM TOPICAL
Qty: 30 | Refills: 0 | OUTPATIENT
Start: 2019-01-31 | End: 2019-03-01

## 2019-01-31 RX ADMIN — LIDOCAINE 1 PATCH: 4 CREAM TOPICAL at 00:17

## 2019-01-31 RX ADMIN — LIDOCAINE 1 PATCH: 4 CREAM TOPICAL at 11:30

## 2019-01-31 RX ADMIN — HEPARIN SODIUM 5000 UNIT(S): 5000 INJECTION INTRAVENOUS; SUBCUTANEOUS at 06:39

## 2019-01-31 NOTE — PROGRESS NOTE ADULT - PROBLEM SELECTOR PROBLEM 1
Malignant pleural effusion
Pleural effusion
Pleural effusion

## 2019-01-31 NOTE — DISCHARGE NOTE ADULT - CARE PLAN
Principal Discharge DX:	Pleural effusion  Goal:	breath better  Assessment and plan of treatment:	follow up with Lung Dr. WEIL  Secondary Diagnosis:	BPH (benign prostatic hyperplasia)  Secondary Diagnosis:	Lung cancer  Secondary Diagnosis:	FAINA (acute kidney injury)

## 2019-01-31 NOTE — PROGRESS NOTE ADULT - PROVIDER SPECIALTY LIST ADULT
Family Medicine
Infectious Disease
Internal Medicine
Nephrology
Nephrology
Pulmonology
Nephrology
Pulmonology

## 2019-01-31 NOTE — DISCHARGE NOTE ADULT - CARE PROVIDER_API CALL
Ayad Junior ()  Family Medicine  [address], [address]  [place] [address]  Phone: [phone]  Fax: [phone]    Weil, Peter A (MD)  Critical Care Medicine; Internal Medicine; Pulmonary Disease  [address], [address]  [place] [address]  Phone: [phone]  Fax: [phone]    Klaus Haley)  Hematology; Internal Medicine; Medical Oncology  [address], [address]  [place] [address]  Phone: [phone]  Fax: [phone]    Yo Varela)  Orthopaedic Surgery  [address] [place] [address]  Phone: [phone]  Fax: [phone]

## 2019-01-31 NOTE — PROGRESS NOTE ADULT - PROBLEM SELECTOR PLAN 1
lung ca with mets  malignant pleural eff  will unclamp CT this am  will monitor output  path pending  tylenol PRN for pain  I bunny  monitored off ABX - agree with ID  labs and cx reviewed  supportive medical regimen  respiratory status improved  out of bed as tolerated
MPE  path reviewed  1400 cc drained from CT left side over last 24 hrs  will check CXR this am PA and LAT  will consider DC CT if drainage tapered off  at risk for reaccumulation - will need pleurodesis if fluid returns  will follow  prognosis guarded  mets ca  known lung ca  onc following
MPE - s/p 5 L drained from left chest  CT removed  I bunny  Cr better  overall doing better and well  DC planning  at risk for recurrence of Effusion - may need Pleurodesis in the future  Onc follow up  pain assessment  tolerating room air without diff.
lung ca  on Osimertinib  s/p 3 L drained yesterday - bloody eff - likely MPE  CXR repeat shows large amount fluid residual - will arrange for IR chest tube insertion   discussed with pt this am  path and micro pending from thoracentesis, pH consistent with MPE  prognosis guarded  will follow
Malignant pleural effusion  S/P thoracentesis  S/P chest tube -- unclamped today -- monitor output  Pulmonary f/u
Malignant pleural effusion  S/P thoracentesis  S/P chest tube -- unclamped today -- monitor output  Pulmonary f/u
Management as per pulmonary/oncology

## 2019-01-31 NOTE — PROGRESS NOTE ADULT - ASSESSMENT
1.	FAINA: Prerenal azotemia, ACEI rx  2.	Lung Ca, Malignant Pleural effusion  3.	Hypertension    Improving renal indices. Off Lotensin. Avoid nephrotoxins. Monitor BP trend. Titrate BP meds as needed. Salt restriction.   S/p thoracentesis. Hematology & Pulmonary follow up. Will follow electrolytes and renal function trend.  D/c planning. To check repeat labs with PCP and out pt follow up as needed.
1.	FAINA: Prerenal azotemia, ACEI rx  2.	Lung Ca, Malignant Pleural effusion  3.	Hypertension    Improving renal indices. Off Lotensin. Avoid nephrotoxins. Will d/c IVF.   Monitor BP trend. Titrate BP meds as needed. Salt restriction.   S/p thoracentesis. Hematology & Pulmonary follow up. Will follow electrolytes and renal function trend.
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 He reportedly saw Dr. Duckworth about a month ago and presents now to [place] ER with 5 day history of acute dyspnea that is greatly improved after drainage no fever, no chills, no productive cough.    sp left thoracentesis with drainage of about 3000cc fluid followed by IR drain placement with 2000 cc more.  Cytology malignant  Cx all NTD  No concern of active/uncontrolled infection on exam
79 yo male with a history of  lung cancer, now admitted with shortness of breath and found to have pleural effusion and had  left thoracentesis with drainage of about 3000cc fluid. Clinically he is much improved now. He also had FAINA which is most likely ATN vs pre renal azotemia improving with IVF. Renal indices are much better. Spoke to family at bed side. will follow.
This is a 78 M comes with SOB
This is a 78 M comes with SOB

## 2019-01-31 NOTE — CONSULT NOTE ADULT - CONSULT REASON
FAINA
Lung Cancer with pleural effusion - C34.82, J91.0
Metastatic Bone Lesions
pleural eff  atelectasis  lung ca  dyspnea and cough
dyspnea

## 2019-01-31 NOTE — CHART NOTE - NSCHARTNOTEFT_GEN_A_CORE
Do you have Advance Directives (HCP / LV / Organ donation / Documentation of oral advance Directive):   (  x  )  yes    (      )    NO                                                                            Do you have LV - Living will :                                                                                                                                             (    )  yes    (    x  )   No    Do you have HCP - Health Care Proxy:                                                                                                                            (   x  )  yes   (       ) N0    Do you have DNR- Do Not Resuscitate :                                                                                                                           (      )  yes  (    x    )  No    Do you have DNI- Do Not intubate  :                                                                                                                               (      )  yes   (   x    ) No    Do you have MOLST - Medical orders for Life sustaining treatment  :                                                                    (      ) yes    (   x    ) No    Decision Maker :  (   x  ) Patient     (      )  HCA   (     ) Public Health Law Surrogate     (      ) Surrogate  (       ) Guardian    Goals of Care :  (   x   )   Complete Care     (       ) No Limitations                              (       )   Comfort Care       (       )  Hospice                               (      )   Limited medical Intervention / s    Medical Interventions :   (  x      )   CPR       (        )  DNR                                               (     x   )  Intubation with MV - Mechanical Ventilation  (    x  ) BIPAP/CPAP    (         )   DNI                                               (    x     )  Artificial Nutrition -  IVF, TPN / PPN, Tube Feeds             (         )   No Feeding Tube                                                (     x   ) Use Antibiotics                         (          ) No Antibiotics                                                (     x    ) Blood and Blood Products     (         )   No Blood or Blood products                                                (    x      )  Dialysis                                    (         )  No Dialysis                                                (          )  Medical Management only  (         )  No Invasive Interventions or Surgery  Time spent :                        (   x    ) up to 30 minutes                       (           )   more than 30 minutes  Goals of care by palliative care reviewed and discussed

## 2019-01-31 NOTE — CONSULT NOTE ADULT - CONSULT REQUESTED DATE/TIME
27-Jan-2019 20:05
28-Jan-2019 07:59
28-Jan-2019 10:59
31-Jan-2019 11:23
28-Jan-2019 13:13
Sinus rhythm with 1st degree AV Block. ST elevations.

## 2019-01-31 NOTE — DISCHARGE NOTE ADULT - MEDICATION SUMMARY - MEDICATIONS TO STOP TAKING
I will STOP taking the medications listed below when I get home from the hospital:    Lotensin HCT 20 mg-12.5 mg oral tablet  -- 1 tab(s) by mouth once a day

## 2019-01-31 NOTE — DISCHARGE NOTE ADULT - PATIENT PORTAL LINK FT
You can access the [facility] Patient Portal, offered by [facility], by registering with the following website: [email or web address]

## 2019-01-31 NOTE — DISCHARGE NOTE ADULT - MEDICATION SUMMARY - MEDICATIONS TO TAKE
I will START or STAY ON the medications listed below when I get home from the hospital:    tamsulosin 0.4 mg oral capsule  -- orally 2 times a day  -- Indication: For BPH (benign prostatic hyperplasia)    albuterol 90 mcg/inh inhalation aerosol  -- 2 puff(s) inhaled every 6 hours, As needed, Shortness of Breath and/or Wheezing  -- Indication: For SOB (shortness of breath)    Tagrisso 80 mg oral tablet  -- per oncology  -- Indication: For Lung cancer I will START or STAY ON the medications listed below when I get home from the hospital:    tamsulosin 0.4 mg oral capsule  -- orally 2 times a day  -- Indication: For BPH (benign prostatic hyperplasia)    albuterol 90 mcg/inh inhalation aerosol  -- 2 puff(s) inhaled every 6 hours, As needed, Shortness of Breath and/or Wheezing  -- Indication: For Shortness of breath    Tagrisso 80 mg oral tablet  -- per oncology  -- Indication: For Lung cancer I will START or STAY ON the medications listed below when I get home from the hospital:    tamsulosin 0.4 mg oral capsule  -- orally 2 times a day  -- Indication: For BPH (benign prostatic hyperplasia)    albuterol 90 mcg/inh inhalation aerosol  -- 2 puff(s) inhaled every 6 hours, As needed, Shortness of Breath and/or Wheezing  -- Indication: For Shortness of breath    Lidoderm 5% topical film  -- Apply on skin to affected area once a day   -- Indication: For Pain    Tagrisso 80 mg oral tablet  -- per oncology  -- Indication: For Lung cancer

## 2019-01-31 NOTE — CONSULT NOTE ADULT - ASSESSMENT
79 yo male with history of EGFR+ lung carcinoma followed by Dr. Duckworth as outpatient, previously progressed after Tarceva and Iressa, currently on Tagrisso since July 2018, presented to ER at [place] with symptoms of acute dyspnea over 5 days and found to have large left pleural effusion; CT evidence also with bony metastatic disease including cervical and lubar vertebral bodies and left third rib abnormalities  - s/p emergent thoracentesis with 3L removed and clinical improvement  - on IV antibiotics; however suspect that effusion is malignant; would await pathology/cytology results   - per patient, planned for additional fluid drainage later today; will discuss with pulmonary  - patient to follow up with own oncologist (Dr. Duckworth) upon discharge to determine need for change in therapy  - no acute inpatient oncology interventions necessary at this time.  - will discuss with primary team
A/P: 78M w/ Multifocal Metastatic Bone Lesions  Analgesia  DVT ppx  WBAT  PT/OT  Encourage incentive spirometry  No acute orthopedic surgical intervention indicated at this time  Ortho stable for d/c  FU with orthopedic surgeon within 1 week of discharge from hospital  Discussed with attending Dr. Varela who is in agreement with above plan. Will advise if plan changes
1.	FAINA: Prerenal azotemia, ACEI rx  2.	Lung Ca, Pleural effusion  3.	Hypertension    Improving renal indices. On IVF. Hold Lotensin. Sono results noted. Avoid nephrotoxins.   Monitor BP trend. Titrate BP meds as needed. Salt restriction. S/p thoracentesis. Hematology follow up.   Will follow electrolytes and renal function trend. D/w family at bedside.   Further recommendations pending clinical course. Thank you for the courtesy of this referral.
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 He reportedly saw Dr. Duckworth about a month ago and presents now to [place] ER with 5 day history of acute dyspnea that is greatly improved after drainge no fever, no chills, no productive cough.    sp left thoracentesis with drainage of about 3000cc fluid followed by IR drain placement with 2000 cc more.

## 2019-01-31 NOTE — DISCHARGE NOTE ADULT - CARE PROVIDERS DIRECT ADDRESSES
[email or web address]

## 2019-01-31 NOTE — DISCHARGE NOTE ADULT - HOSPITAL COURSE
admitted for dyspnea / SOB  found to have large left malignant pleural effusion  underwent bedside thoracentesis  later chest tube was inserted  also has metastatic lung ca  found to have right hip lesion  no surgical / ortho intervention  FAINA - HCTZ and ARB cleared  DC after PULM and renal clearance No

## 2019-01-31 NOTE — CONSULT NOTE ADULT - SUBJECTIVE AND OBJECTIVE BOX
Patient is a 78M community ambulator who presents today with a history of primary lung adenocarcinoma with metastasis to the bone. Patient has been followed as an outpatient by Dr. 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 He has also seen Dr. Steinberg in the past for his orthopedic care. Today, patient denies having any acute orthopedic complaints. Denies any numbness or tingling. Denies weakness or difficulty ambulating. Patient had a NM bone scan as part of his workup while in the hospitalized and was found to have multiple osseous lesions consistent with metastasis.    PAST MEDICAL & SURGICAL HISTORY:  Lung cancer  BPH (benign prostatic hyperplasia)  No significant past surgical history    Home Medications:  albuterol 90 mcg/inh inhalation aerosol: 2 puff(s) inhaled every 6 hours, As needed, Shortness of Breath and/or Wheezing (30 Jan 2019 20:42)  Tagrisso 80 mg oral tablet: per oncology (30 Jan 2019 20:42)  tamsulosin 0.4 mg oral capsule: orally 2 times a day (28 Jan 2019 14:47)    Allergies  erythromycin (Other)    PHYSICAL EXAM:  Vital Signs Last 24 Hrs  T(C): 36.7 (31 Jan 2019 08:00), Max: 37.4 (30 Jan 2019 19:51)  T(F): 98.1 (31 Jan 2019 08:00), Max: 99.4 (30 Jan 2019 19:51)  HR: 91 (31 Jan 2019 08:00) (82 - 95)  BP: 113/57 (31 Jan 2019 08:00) (106/62 - 130/74)  BP(mean): --  RR: 17 (31 Jan 2019 08:00) (16 - 18)  SpO2: 97% (31 Jan 2019 08:00) (95% - 99%)    Gen: NAD; Resting comfortably; A&O x 3  B/L LE:  Skin intact; No erythema or ecchymosis  No gross deformities appreciated  No ttp  Full painless AROM and PROM of Hip/Knee/Ankle  +EHL/FHL/TA/GSC  +DP  Compartments soft and compressible  No calf tenderness    NM Bone Scan:  Metastatic Lesions in Spine, Left 4 Ant Rib, Prox R Hum, B/l Prox Femurs, R Scapula, R Acetabulum/Pelvis

## 2019-01-31 NOTE — PROGRESS NOTE ADULT - SUBJECTIVE AND OBJECTIVE BOX
Date/Time Patient Seen:  		  Referring MD:   Data Reviewed	       Patient is a 78y old  Male who presents with a chief complaint of SOB (28 Jan 2019 13:11)      Subjective/HPI     PAST MEDICAL & SURGICAL HISTORY:  Lung cancer  BPH (benign prostatic hyperplasia)  No significant past surgical history        Medication list         MEDICATIONS  (STANDING):  heparin  Injectable 5000 Unit(s) SubCutaneous every 12 hours  influenza   Vaccine 0.5 milliLiter(s) IntraMuscular once  lidocaine   Patch 1 Patch Transdermal daily  sodium chloride 0.9%. 1000 milliLiter(s) (75 mL/Hr) IV Continuous <Continuous>  tamsulosin 0.4 milliGRAM(s) Oral at bedtime    MEDICATIONS  (PRN):  ALBUTerol    90 MICROgram(s) HFA Inhaler 2 Puff(s) Inhalation every 6 hours PRN Shortness of Breath and/or Wheezing  guaiFENesin    Syrup 100 milliGRAM(s) Oral every 6 hours PRN Cough  traMADol 25 milliGRAM(s) Oral three times a day PRN Mild Pain (1 - 3)         Vitals log        ICU Vital Signs Last 24 Hrs  T(C): 36.9 (29 Jan 2019 04:08), Max: 37.1 (28 Jan 2019 23:20)  T(F): 98.5 (29 Jan 2019 04:08), Max: 98.8 (28 Jan 2019 23:20)  HR: 89 (29 Jan 2019 04:08) (84 - 94)  BP: 113/65 (29 Jan 2019 04:08) (113/65 - 137/54)  BP(mean): 79 (28 Jan 2019 19:53) (79 - 79)  ABP: --  ABP(mean): --  RR: 17 (29 Jan 2019 04:08) (16 - 18)  SpO2: 96% (29 Jan 2019 04:08) (96% - 98%)           Input and Output:  I&O's Detail    28 Jan 2019 07:01  -  29 Jan 2019 07:00  --------------------------------------------------------  IN:    Oral Fluid: 120 mL    sodium chloride 0.9%.: 825 mL  Total IN: 945 mL    OUT:    Chest Tube: 2060 mL    Voided: 600 mL  Total OUT: 2660 mL    Total NET: -1715 mL          Lab Data                        9.8    8.03  )-----------( 231      ( 29 Jan 2019 06:15 )             30.2     01-29    141  |  109<H>  |  53<H>  ----------------------------<  95  4.7   |  24  |  1.70<H>    Ca    8.2<L>      29 Jan 2019 06:15  Mg     2.2     01-29    TPro  7.4  /  Alb  2.9<L>  /  TBili  0.5  /  DBili  x   /  AST  13<L>  /  ALT  16  /  AlkPhos  98  01-27            Review of Systems	      Objective     Physical Examination    heart s1s2  lung dec BS  chest tube left side  on room air      Pertinent Lab findings & Imaging      No:  NO   Adequate UO     I&O's Detail    28 Jan 2019 07:01  -  29 Jan 2019 07:00  --------------------------------------------------------  IN:    Oral Fluid: 120 mL    sodium chloride 0.9%.: 825 mL  Total IN: 945 mL    OUT:    Chest Tube: 2060 mL    Voided: 600 mL  Total OUT: 2660 mL    Total NET: -1715 mL               Discussed with:     Cultures:	        Radiology
Patient is a 78y old  Male who presents with a chief complaint of SOB (30 Jan 2019 07:32)      Patient seen in follow up for FAINA    PAST MEDICAL HISTORY:  Lung cancer  BPH (benign prostatic hyperplasia)    MEDICATIONS  (STANDING):  heparin  Injectable 5000 Unit(s) SubCutaneous every 8 hours  influenza   Vaccine 0.5 milliLiter(s) IntraMuscular once  lidocaine   Patch 1 Patch Transdermal daily  sodium chloride 0.9%. 1000 milliLiter(s) (75 mL/Hr) IV Continuous <Continuous>  tamsulosin 0.4 milliGRAM(s) Oral at bedtime    MEDICATIONS  (STANDING):  heparin  Injectable 5000 Unit(s) SubCutaneous every 8 hours  influenza   Vaccine 0.5 milliLiter(s) IntraMuscular once  lidocaine   Patch 1 Patch Transdermal daily  tamsulosin 0.4 milliGRAM(s) Oral at bedtime    MEDICATIONS  (PRN):  ALBUTerol    90 MICROgram(s) HFA Inhaler 2 Puff(s) Inhalation every 6 hours PRN Shortness of Breath and/or Wheezing  guaiFENesin    Syrup 100 milliGRAM(s) Oral every 6 hours PRN Cough  traMADol 25 milliGRAM(s) Oral three times a day PRN Mild Pain (1 - 3)    T(C): 36.7 (01-31-19 @ 12:00), Max: 37.4 (01-30-19 @ 19:51)  HR: 109 (01-31-19 @ 12:00) (79 - 109)  BP: 127/61 (01-31-19 @ 12:00) (106/62 - 134/76)  RR: 17 (01-31-19 @ 12:00)  SpO2: 96% (01-31-19 @ 12:00)  Wt(kg): --  I&O's Detail    30 Jan 2019 07:01  -  31 Jan 2019 07:00  --------------------------------------------------------  IN:    Oral Fluid: 200 mL    sodium chloride 0.9%: 675 mL  Total IN: 875 mL    OUT:    Voided: 1170 mL  Total OUT: 1170 mL    Total NET: -295 mL      31 Jan 2019 07:01  -  31 Jan 2019 12:50  --------------------------------------------------------  IN:  Total IN: 0 mL    OUT:    Voided: 100 mL  Total OUT: 100 mL    Total NET: -100 mL              PHYSICAL EXAM:  General: NAD  Respiratory: b/l air entry  Cardiovascular: S1 S2  Gastrointestinal: soft  Extremities:  no edema                       LABORATORY:                        9.7    7.51  )-----------( 229      ( 30 Jan 2019 06:29 )             29.1     01-31    140  |  106  |  36<H>  ----------------------------<  98  4.1   |  26  |  1.40<H>    Ca    8.4<L>      31 Jan 2019 06:52  Mg     2.0     01-30      Sodium, Serum: 140 mmol/L (01-31 @ 06:52)  Sodium, Serum: 140 mmol/L (01-30 @ 06:29)    Potassium, Serum: 4.1 mmol/L (01-31 @ 06:52)  Potassium, Serum: 4.4 mmol/L (01-30 @ 06:29)    Hemoglobin: 9.7 g/dL (01-30 @ 06:29)  Hemoglobin: 9.8 g/dL (01-29 @ 06:15)    Creatinine, Serum 1.40 (01-31 @ 06:52)  Creatinine, Serum 1.50 (01-30 @ 06:29)  Creatinine, Serum 1.70 (01-29 @ 06:15)
[facility], Division of Infectious Diseases  JEFFERY Nevarez A. Lee  [phone]    Name: FRANCESCA RENEE  Age: 78y  Gender: Male  MRN: [id number]    Interval History--  Notes reviewed. Wants to go home.  No pain except at chest tube site. No fevers, chills, or rigors. No SOB. No N/V.    Past Medical History--  Lung cancer  BPH (benign prostatic hyperplasia)  No significant past surgical history      For details regarding the patient's social history, family history, and other miscellaneous elements, please refer the initial infectious diseases consultation and/or the admitting history and physical examination for this admission.    Allergies    erythromycin (Other)    Intolerances        Medications--  Antibiotics:    Immunologic:  influenza   Vaccine 0.5 milliLiter(s) IntraMuscular once    Other:  ALBUTerol    90 MICROgram(s) HFA Inhaler PRN  guaiFENesin    Syrup PRN  heparin  Injectable  lidocaine   Patch  sodium chloride 0.9%.  tamsulosin  traMADol PRN      Review of Systems--  A 10-point review of systems was obtained.   Review of systems otherwise negative except as previously noted.    Physical Examination--  Vital Signs: T(F): 97.8 (01-30-19 @ 08:00), Max: 99.2 (01-29-19 @ 23:05)  HR: 85 (01-30-19 @ 08:00)  BP: 120/69 (01-30-19 @ 08:00)  RR: 18 (01-30-19 @ 08:00)  SpO2: 96% (01-30-19 @ 08:00)  Wt(kg): --  General: Nontoxic-appearing Male in no acute distress.  HEENT: AT/NC. PERRL. EOMI. Anicteric. Conjunctiva pink and moist. Oropharynx clear. Dentition fair.  Neck: Not rigid. No sense of mass.  Nodes: None palpable.  Lungs: Diminished breath sounds bilaterally without rales, wheezing or rhonchi  Heart: Regular rate and rhythm. No Murmur. No rub. No gallop. No palpable thrill.  Abdomen: Bowel sounds present and normoactive. Soft. Nondistended. Nontender. No sense of mass. No organomegaly.  Back: No spinal tenderness. No costovertebral angle tenderness.   Extremities: No cyanosis or clubbing. No edema.   Skin: Warm. Dry. Good turgor. No rash. No vasculitic stigmata.  Psychiatric: Appropriate affect and mood for situation.         Laboratory Studies--  CBC                        9.7    7.51  )-----------( 229      ( 30 Jan 2019 06:29 )             29.1       Chemistries  01-30    140  |  108  |  38<H>  ----------------------------<  98  4.4   |  25  |  1.50<H>    Ca    8.2<L>      30 Jan 2019 06:29  Mg     2.0     01-30        Culture Data    Culture - Fungal, Body Fluid (collected 27 Jan 2019 22:54)  Source: Pleural Fl Pleural Fluid  Preliminary Report (29 Jan 2019 07:59):    Testing in progress    Culture - Body Fluid with Gram Stain (collected 27 Jan 2019 22:54)  Source: Pleural Fl Pleural Fluid  Gram Stain (27 Jan 2019 23:42):    polymorphonuclear leukocytes seen    No organisms seen    by cytocentrifuge  Preliminary Report (28 Jan 2019 21:34):    No growth to date.    Culture - Acid Fast - Body Fluid w/Smear (collected 27 Jan 2019 22:54)  Source: Pleural Fl Pleural Fluid    Culture - Blood (collected 27 Jan 2019 19:58)  Source: .Blood Blood-Peripheral  Preliminary Report (28 Jan 2019 20:01):    No growth to date.    Culture - Blood (collected 27 Jan 2019 19:58)  Source: .Blood Blood-Peripheral  Preliminary Report (28 Jan 2019 20:01):    No growth to date.
Date/Time Patient Seen:  		  Referring MD:   Data Reviewed	       Patient is a 78y old  Male who presents with a chief complaint of     Subjective/HPI     PAST MEDICAL & SURGICAL HISTORY:  Lung cancer  BPH (benign prostatic hyperplasia)  No significant past surgical history        Medication list         MEDICATIONS  (STANDING):  heparin  Injectable 5000 Unit(s) SubCutaneous every 12 hours  levoFLOXacin IVPB 250 milliGRAM(s) IV Intermittent every 24 hours  lidocaine   Patch 1 Patch Transdermal daily  sodium chloride 0.9%. 1000 milliLiter(s) (75 mL/Hr) IV Continuous <Continuous>  tamsulosin 0.4 milliGRAM(s) Oral at bedtime    MEDICATIONS  (PRN):  ALBUTerol    90 MICROgram(s) HFA Inhaler 2 Puff(s) Inhalation every 6 hours PRN Shortness of Breath and/or Wheezing  guaiFENesin    Syrup 100 milliGRAM(s) Oral every 6 hours PRN Cough  traMADol 25 milliGRAM(s) Oral three times a day PRN Mild Pain (1 - 3)         Vitals log        ICU Vital Signs Last 24 Hrs  T(C): 37 (28 Jan 2019 05:38), Max: 37 (28 Jan 2019 05:38)  T(F): 98.6 (28 Jan 2019 05:38), Max: 98.6 (28 Jan 2019 05:38)  HR: 82 (28 Jan 2019 05:38) (82 - 125)  BP: 117/51 (28 Jan 2019 05:38) (95/60 - 128/51)  BP(mean): --  ABP: --  ABP(mean): --  RR: 19 (28 Jan 2019 05:38) (18 - 19)  SpO2: 97% (28 Jan 2019 05:38) (93% - 97%)           Input and Output:  I&O's Detail    27 Jan 2019 07:01  -  28 Jan 2019 07:00  --------------------------------------------------------  IN:  Total IN: 0 mL    OUT:    Voided: 450 mL  Total OUT: 450 mL    Total NET: -450 mL          Lab Data                        10.1   7.75  )-----------( 218      ( 28 Jan 2019 05:19 )             31.5     01-28    141  |  108  |  74<H>  ----------------------------<  92  4.8   |  23  |  2.60<H>    Ca    8.0<L>      28 Jan 2019 05:19    TPro  7.4  /  Alb  2.9<L>  /  TBili  0.5  /  DBili  x   /  AST  13<L>  /  ALT  16  /  AlkPhos  98  01-27            Review of Systems	      Objective     Physical Examination    on room air  lung dec BS  abd soft  cn grossly int      Pertinent Lab findings & Imaging      No:  NO   Adequate UO     I&O's Detail    27 Jan 2019 07:01  -  28 Jan 2019 07:00  --------------------------------------------------------  IN:  Total IN: 0 mL    OUT:    Voided: 450 mL  Total OUT: 450 mL    Total NET: -450 mL               Discussed with:     Cultures:	        Radiology
Date/Time Patient Seen:  		  Referring MD:   Data Reviewed	       Patient is a 78y old  Male who presents with a chief complaint of SOB (29 Jan 2019 13:55)      Subjective/HPI     PAST MEDICAL & SURGICAL HISTORY:  Lung cancer  BPH (benign prostatic hyperplasia)  No significant past surgical history        Medication list         MEDICATIONS  (STANDING):  heparin  Injectable 5000 Unit(s) SubCutaneous every 12 hours  influenza   Vaccine 0.5 milliLiter(s) IntraMuscular once  lidocaine   Patch 1 Patch Transdermal daily  sodium chloride 0.9%. 1000 milliLiter(s) (75 mL/Hr) IV Continuous <Continuous>  tamsulosin 0.4 milliGRAM(s) Oral at bedtime    MEDICATIONS  (PRN):  ALBUTerol    90 MICROgram(s) HFA Inhaler 2 Puff(s) Inhalation every 6 hours PRN Shortness of Breath and/or Wheezing  guaiFENesin    Syrup 100 milliGRAM(s) Oral every 6 hours PRN Cough  traMADol 25 milliGRAM(s) Oral three times a day PRN Mild Pain (1 - 3)         Vitals log        ICU Vital Signs Last 24 Hrs  T(C): 36.8 (30 Jan 2019 04:28), Max: 37.3 (29 Jan 2019 15:52)  T(F): 98.3 (30 Jan 2019 04:28), Max: 99.2 (29 Jan 2019 23:05)  HR: 89 (30 Jan 2019 04:28) (79 - 93)  BP: 111/65 (30 Jan 2019 04:28) (111/65 - 134/76)  BP(mean): --  ABP: --  ABP(mean): --  RR: 18 (30 Jan 2019 04:28) (18 - 18)  SpO2: 94% (30 Jan 2019 04:28) (94% - 97%)           Input and Output:  I&O's Detail    29 Jan 2019 07:01  -  30 Jan 2019 07:00  --------------------------------------------------------  IN:    Oral Fluid: 60 mL    sodium chloride 0.9%.: 750 mL  Total IN: 810 mL    OUT:    Voided: 400 mL  Total OUT: 400 mL    Total NET: 410 mL          Lab Data                        9.7    7.51  )-----------( 229      ( 30 Jan 2019 06:29 )             29.1     01-30    140  |  108  |  38<H>  ----------------------------<  98  4.4   |  25  |  1.50<H>    Ca    8.2<L>      30 Jan 2019 06:29  Mg     2.0     01-30              Review of Systems	      Objective     Physical Examination    heart s1s2  lung dec BS      Pertinent Lab findings & Imaging      No:  NO   Adequate UO     I&O's Detail    29 Jan 2019 07:01  -  30 Jan 2019 07:00  --------------------------------------------------------  IN:    Oral Fluid: 60 mL    sodium chloride 0.9%.: 750 mL  Total IN: 810 mL    OUT:    Voided: 400 mL  Total OUT: 400 mL    Total NET: 410 mL               Discussed with:     Cultures:	        Radiology
Date/Time Patient Seen:  		  Referring MD:   Data Reviewed	       Patient is a 78y old  Male who presents with a chief complaint of SOB (30 Jan 2019 16:00)      Subjective/HPI     PAST MEDICAL & SURGICAL HISTORY:  Lung cancer  BPH (benign prostatic hyperplasia)  No significant past surgical history        Medication list         MEDICATIONS  (STANDING):  heparin  Injectable 5000 Unit(s) SubCutaneous every 8 hours  influenza   Vaccine 0.5 milliLiter(s) IntraMuscular once  lidocaine   Patch 1 Patch Transdermal daily  tamsulosin 0.4 milliGRAM(s) Oral at bedtime    MEDICATIONS  (PRN):  ALBUTerol    90 MICROgram(s) HFA Inhaler 2 Puff(s) Inhalation every 6 hours PRN Shortness of Breath and/or Wheezing  guaiFENesin    Syrup 100 milliGRAM(s) Oral every 6 hours PRN Cough  traMADol 25 milliGRAM(s) Oral three times a day PRN Mild Pain (1 - 3)         Vitals log        ICU Vital Signs Last 24 Hrs  T(C): 36.7 (31 Jan 2019 04:33), Max: 37.4 (30 Jan 2019 19:51)  T(F): 98 (31 Jan 2019 04:33), Max: 99.4 (30 Jan 2019 19:51)  HR: 82 (31 Jan 2019 04:33) (82 - 95)  BP: 118/66 (31 Jan 2019 04:33) (106/62 - 130/74)  BP(mean): --  ABP: --  ABP(mean): --  RR: 18 (31 Jan 2019 04:33) (16 - 18)  SpO2: 99% (31 Jan 2019 04:33) (95% - 99%)           Input and Output:  I&O's Detail    30 Jan 2019 07:01  -  31 Jan 2019 07:00  --------------------------------------------------------  IN:    Oral Fluid: 200 mL    sodium chloride 0.9%: 675 mL  Total IN: 875 mL    OUT:    Voided: 1170 mL  Total OUT: 1170 mL    Total NET: -295 mL          Lab Data                        9.7    7.51  )-----------( 229      ( 30 Jan 2019 06:29 )             29.1     01-30    140  |  108  |  38<H>  ----------------------------<  98  4.4   |  25  |  1.50<H>    Ca    8.2<L>      30 Jan 2019 06:29  Mg     2.0     01-30              Review of Systems	      Objective     Physical Examination    heart s1s2  lung dec BS  abd soft      Pertinent Lab findings & Imaging      No:  NO   Adequate UO     I&O's Detail    30 Jan 2019 07:01  -  31 Jan 2019 07:00  --------------------------------------------------------  IN:    Oral Fluid: 200 mL    sodium chloride 0.9%: 675 mL  Total IN: 875 mL    OUT:    Voided: 1170 mL  Total OUT: 1170 mL    Total NET: -295 mL               Discussed with:     Cultures:	        Radiology
FRANCESCA RENEE  78y  Male    Patient is a 78y old  Male who presents with a chief complaint of SOB (29 Jan 2019 12:39)    feels much better today  on IVF  family at bed side.     PAST MEDICAL & SURGICAL HISTORY:  Lung cancer  BPH (benign prostatic hyperplasia)  No significant past surgical history    PHYSICAL EXAM:    T(C): 37 (01-29-19 @ 11:20), Max: 37.1 (01-28-19 @ 23:20)  HR: 90 (01-29-19 @ 11:20) (84 - 94)  BP: 125/73 (01-29-19 @ 11:20) (113/65 - 137/54)  RR: 18 (01-29-19 @ 11:20) (16 - 18)  SpO2: 97% (01-29-19 @ 11:20) (95% - 97%)  Wt(kg): --    I&O's Detail    28 Jan 2019 07:01  -  29 Jan 2019 07:00  --------------------------------------------------------  IN:    Oral Fluid: 120 mL    sodium chloride 0.9%.: 825 mL  Total IN: 945 mL    OUT:    Chest Tube: 2060 mL    Voided: 600 mL  Total OUT: 2660 mL    Total NET: -1715 mL      Respiratory: clear anteriorly, decreased BS at bases  Cardiovascular: S1 S2  Gastrointestinal: soft NT ND +BS  Extremities: trace edema   Neuro: Awake and alert    MEDICATIONS  (STANDING):  heparin  Injectable 5000 Unit(s) SubCutaneous every 12 hours  influenza   Vaccine 0.5 milliLiter(s) IntraMuscular once  lidocaine   Patch 1 Patch Transdermal daily  sodium chloride 0.9%. 1000 milliLiter(s) (75 mL/Hr) IV Continuous <Continuous>  tamsulosin 0.4 milliGRAM(s) Oral at bedtime    MEDICATIONS  (PRN):  ALBUTerol    90 MICROgram(s) HFA Inhaler 2 Puff(s) Inhalation every 6 hours PRN Shortness of Breath and/or Wheezing  guaiFENesin    Syrup 100 milliGRAM(s) Oral every 6 hours PRN Cough  traMADol 25 milliGRAM(s) Oral three times a day PRN Mild Pain (1 - 3)                            9.8    8.03  )-----------( 231      ( 29 Jan 2019 06:15 )             30.2       01-29    141  |  109<H>  |  53<H>  ----------------------------<  95  4.7   |  24  |  1.70<H>    Ca    8.2<L>      29 Jan 2019 06:15  Mg     2.2     01-29    TPro  7.4  /  Alb  2.9<L>  /  TBili  0.5  /  DBili  x   /  AST  13<L>  /  ALT  16  /  AlkPhos  98  01-27
Patient is a 78y old  Male who presents with a chief complaint of SOB (29 Jan 2019 07:07)      INTERVAL HPI/OVERNIGHT EVENTS: Patient seen and examined. NAD. No complaints.    Vital Signs Last 24 Hrs  T(C): 37 (29 Jan 2019 11:20), Max: 37.1 (28 Jan 2019 23:20)  T(F): 98.6 (29 Jan 2019 11:20), Max: 98.8 (28 Jan 2019 23:20)  HR: 90 (29 Jan 2019 11:20) (84 - 94)  BP: 125/73 (29 Jan 2019 11:20) (113/65 - 137/54)  BP(mean): 79 (28 Jan 2019 19:53) (79 - 79)  RR: 18 (29 Jan 2019 11:20) (16 - 18)  SpO2: 97% (29 Jan 2019 11:20) (95% - 97%)    01-29    141  |  109<H>  |  53<H>  ----------------------------<  95  4.7   |  24  |  1.70<H>    Ca    8.2<L>      29 Jan 2019 06:15  Mg     2.2     01-29    TPro  7.4  /  Alb  2.9<L>  /  TBili  0.5  /  DBili  x   /  AST  13<L>  /  ALT  16  /  AlkPhos  98  01-27                          9.8    8.03  )-----------( 231      ( 29 Jan 2019 06:15 )             30.2     PT/INR - ( 27 Jan 2019 16:48 )   PT: 13.8 sec;   INR: 1.21 ratio         PTT - ( 27 Jan 2019 16:48 )  PTT:26.5 sec  CAPILLARY BLOOD GLUCOSE      POCT Blood Glucose.: 102 mg/dL (29 Jan 2019 11:48)              ALBUTerol    90 MICROgram(s) HFA Inhaler 2 Puff(s) Inhalation every 6 hours PRN  guaiFENesin    Syrup 100 milliGRAM(s) Oral every 6 hours PRN  heparin  Injectable 5000 Unit(s) SubCutaneous every 12 hours  influenza   Vaccine 0.5 milliLiter(s) IntraMuscular once  lidocaine   Patch 1 Patch Transdermal daily  sodium chloride 0.9%. 1000 milliLiter(s) IV Continuous <Continuous>  tamsulosin 0.4 milliGRAM(s) Oral at bedtime  traMADol 25 milliGRAM(s) Oral three times a day PRN              REVIEW OF SYSTEMS:  CONSTITUTIONAL: No fever, no weight loss, or no fatigue  NECK: No pain, no stiffness  RESPIRATORY: No cough, no wheezing, no chills, no hemoptysis, No shortness of breath  CARDIOVASCULAR: No chest pain, no palpitations, no dizziness, no leg swelling  GASTROINTESTINAL: No abdominal pain. No nausea, no vomiting, no hematemesis; No diarrhea, no constipation. No melena, no hematochezia.  GENITOURINARY: No dysuria, no frequency, no hematuria, no incontinence  NEUROLOGICAL: No headaches, no loss of strength, no numbness, no tremors  SKIN: No itching, no burning  MUSCULOSKELETAL: No joint pain, no swelling; No muscle, no back, no extremity pain  PSYCHIATRIC: No depression, no mood swings,   HEME/LYMPH: No easy bruising, no bleeding gums  ALLERY AND IMMUNOLOGIC: No hives       Consultant(s) Notes Reviewed:  [X] YES  [ ] NO    PHYSICAL EXAM:  GENERAL: NAD  HEAD:  Atraumatic, Normocephalic  EYES: EOMI, PERRLA, conjunctiva and sclera clear  ENMT: No tonsillar erythema, exudates, or enlargement; Moist mucous membranes  NECK: Supple, No JVD  NERVOUS SYSTEM:  Awake & alert  CHEST/LUNG: Clear to auscultation bilaterally; No rales, rhonchi, wheezing,  HEART: Regular rate and rhythm  ABDOMEN: Soft, Nontender, Nondistended; Bowel sounds present  EXTREMITIES:  No clubbing, cyanosis, or edema  LYMPH: No lymphadenopathy noted  SKIN: No rashes      Advanced care planning discussed with patient/family [X] YES   [ ] NO    Advanced care planning discussed with patient/family. Advanced care planning forms reviewed/discussed/completed. 20 minutes spent.
Patient is a 78y old  Male who presents with a chief complaint of SOB (30 Jan 2019 07:32)      Patient seen in follow up for     PAST MEDICAL HISTORY:  Lung cancer  BPH (benign prostatic hyperplasia)    MEDICATIONS  (STANDING):  heparin  Injectable 5000 Unit(s) SubCutaneous every 8 hours  influenza   Vaccine 0.5 milliLiter(s) IntraMuscular once  lidocaine   Patch 1 Patch Transdermal daily  sodium chloride 0.9%. 1000 milliLiter(s) (75 mL/Hr) IV Continuous <Continuous>  tamsulosin 0.4 milliGRAM(s) Oral at bedtime    MEDICATIONS  (PRN):  ALBUTerol    90 MICROgram(s) HFA Inhaler 2 Puff(s) Inhalation every 6 hours PRN Shortness of Breath and/or Wheezing  guaiFENesin    Syrup 100 milliGRAM(s) Oral every 6 hours PRN Cough  traMADol 25 milliGRAM(s) Oral three times a day PRN Mild Pain (1 - 3)    T(C): 37.1 (01-30-19 @ 11:43), Max: 37.3 (01-29-19 @ 15:52)  HR: 91 (01-30-19 @ 11:43) (79 - 93)  BP: 118/72 (01-30-19 @ 11:43) (111/65 - 134/76)  RR: 18 (01-30-19 @ 11:43) (17 - 18)  SpO2: 97% (01-30-19 @ 11:43) (94% - 97%)  Wt(kg): --  I&O's Detail    29 Jan 2019 07:01  -  30 Jan 2019 07:00  --------------------------------------------------------  IN:    Oral Fluid: 60 mL    sodium chloride 0.9%.: 750 mL  Total IN: 810 mL    OUT:    Voided: 400 mL  Total OUT: 400 mL    Total NET: 410 mL      30 Jan 2019 07:01  -  30 Jan 2019 15:30  --------------------------------------------------------  IN:    Oral Fluid: 100 mL  Total IN: 100 mL    OUT:    Voided: 320 mL  Total OUT: 320 mL    Total NET: -220 mL          PHYSICAL EXAM:  General: NAD  Respiratory: b/l air entry, + chest tube  Cardiovascular: S1 S2  Gastrointestinal: soft  Extremities:  no edema                          9.7    7.51  )-----------( 229      ( 30 Jan 2019 06:29 )             29.1     01-30    140  |  108  |  38<H>  ----------------------------<  98  4.4   |  25  |  1.50<H>    Ca    8.2<L>      30 Jan 2019 06:29  Mg     2.0     01-30                Sodium, Serum: 140 (01-30 @ 06:29)  Sodium, Serum: 141 (01-29 @ 06:15)  Sodium, Serum: 141 (01-28 @ 05:19)  Sodium, Serum: 137 (01-27 @ 16:48)    Creatinine, Serum: 1.50 (01-30 @ 06:29)  Creatinine, Serum: 1.70 (01-29 @ 06:15)  Creatinine, Serum: 2.60 (01-28 @ 05:19)  Creatinine, Serum: 3.60 (01-27 @ 16:48)    Potassium, Serum: 4.4 (01-30 @ 06:29)  Potassium, Serum: 4.7 (01-29 @ 06:15)  Potassium, Serum: 4.8 (01-28 @ 05:19)  Potassium, Serum: 5.1 (01-27 @ 16:48)    Hemoglobin: 9.7 (01-30 @ 06:29)  Hemoglobin: 9.8 (01-29 @ 06:15)  Hemoglobin: 10.1 (01-28 @ 05:19)  Hemoglobin: 11.4 (01-27 @ 16:48)
Patient is a 78y old  Male who presents with a chief complaint of SOB (30 Jan 2019 15:29)      INTERVAL /OVERNIGHT EVENTS: feels better, denies SOB    MEDICATIONS  (STANDING):  heparin  Injectable 5000 Unit(s) SubCutaneous every 8 hours  influenza   Vaccine 0.5 milliLiter(s) IntraMuscular once  lidocaine   Patch 1 Patch Transdermal daily  tamsulosin 0.4 milliGRAM(s) Oral at bedtime    MEDICATIONS  (PRN):  ALBUTerol    90 MICROgram(s) HFA Inhaler 2 Puff(s) Inhalation every 6 hours PRN Shortness of Breath and/or Wheezing  guaiFENesin    Syrup 100 milliGRAM(s) Oral every 6 hours PRN Cough  traMADol 25 milliGRAM(s) Oral three times a day PRN Mild Pain (1 - 3)      Allergies    erythromycin (Other)    Intolerances        REVIEW OF SYSTEMS:  CONSTITUTIONAL: No fever, weight loss, or fatigue  EYES: No eye pain, visual disturbances, or discharge  ENMT:  No difficulty hearing, tinnitus, vertigo; No sinus or throat pain  NECK: No pain or stiffness  RESPIRATORY: No cough, wheezing, chills or hemoptysis; No shortness of breath  CARDIOVASCULAR: No chest pain, palpitations, dizziness, or leg swelling  GASTROINTESTINAL: No abdominal or epigastric pain. No nausea, vomiting, or hematemesis; No diarrhea or constipation. No melena or hematochezia.  GENITOURINARY: No dysuria, frequency, hematuria, or incontinence  NEUROLOGICAL: No headaches, memory loss, loss of strength, numbness, or tremors  SKIN: No itching, burning, rashes, or lesions   LYMPH NODES: No enlarged glands  ENDOCRINE: No heat or cold intolerance; No hair loss; No polydipsia or polyuria  MUSCULOSKELETAL: No joint pain or swelling; No muscle, back, or extremity pain  PSYCHIATRIC: No depression, anxiety, mood swings, or difficulty sleeping  HEME/LYMPH: No easy bruising, or bleeding gums  ALLERGY AND IMMUNOLOGIC: No hives or eczema    Vital Signs Last 24 Hrs  T(C): 37.1 (30 Jan 2019 11:43), Max: 37.3 (29 Jan 2019 23:05)  T(F): 98.8 (30 Jan 2019 11:43), Max: 99.2 (29 Jan 2019 23:05)  HR: 91 (30 Jan 2019 11:43) (79 - 93)  BP: 118/72 (30 Jan 2019 11:43) (111/65 - 120/69)  BP(mean): --  RR: 18 (30 Jan 2019 11:43) (18 - 18)  SpO2: 97% (30 Jan 2019 11:43) (94% - 97%)    PHYSICAL EXAM:  GENERAL: NAD, well-groomed, well-developed  HEAD:  Atraumatic, Normocephalic  EYES: EOMI, PERRLA, conjunctiva and sclera clear  ENMT: No tonsillar erythema, exudates, or enlargement; Moist mucous membranes, Good dentition, No lesions  NECK: Supple, No JVD, Normal thyroid  NERVOUS SYSTEM:  Alert & Oriented X3, Good concentration; Motor Strength 5/5 B/L upper and lower extremities; DTRs 2+ intact and symmetric  CHEST/LUNG: Clear to auscultation bilaterally; No rales, rhonchi, wheezing, or rubs  HEART: Regular rate and rhythm; No murmurs, rubs, or gallops  ABDOMEN: Soft, Nontender, Nondistended; Bowel sounds present  EXTREMITIES:  2+ Peripheral Pulses, No clubbing, cyanosis, or edema  LYMPH: No lymphadenopathy noted  SKIN: No rashes or lesions    LABS:                        9.7    7.51  )-----------( 229      ( 30 Jan 2019 06:29 )             29.1     30 Jan 2019 06:29    140    |  108    |  38     ----------------------------<  98     4.4     |  25     |  1.50     Ca    8.2        30 Jan 2019 06:29  Mg     2.0       30 Jan 2019 06:29          CAPILLARY BLOOD GLUCOSE          RADIOLOGY & ADDITIONAL TESTS:    Notes Reviewed:  [x ] YES  [ ] NO    Care Discussed with Consultants/Other Providers [x ] YES  [ ] NO

## 2019-02-01 LAB
CULTURE RESULTS: SIGNIFICANT CHANGE UP
SPECIMEN SOURCE: SIGNIFICANT CHANGE UP

## 2019-02-14 PROBLEM — Z00.00 ENCOUNTER FOR PREVENTIVE HEALTH EXAMINATION: Status: ACTIVE | Noted: 2019-02-14

## 2019-02-15 ENCOUNTER — APPOINTMENT (OUTPATIENT)
Dept: THORACIC SURGERY | Facility: CLINIC | Age: 79
End: 2019-02-15

## 2019-02-15 PROBLEM — N40.0 BENIGN PROSTATIC HYPERPLASIA WITHOUT LOWER URINARY TRACT SYMPTOMS: Chronic | Status: ACTIVE | Noted: 2019-01-27

## 2019-02-15 PROBLEM — C34.90 MALIGNANT NEOPLASM OF UNSPECIFIED PART OF UNSPECIFIED BRONCHUS OR LUNG: Chronic | Status: ACTIVE | Noted: 2019-01-27

## 2019-02-20 ENCOUNTER — APPOINTMENT (OUTPATIENT)
Dept: THORACIC SURGERY | Facility: CLINIC | Age: 79
End: 2019-02-20
Payer: MEDICARE

## 2019-02-20 VITALS
OXYGEN SATURATION: 96 % | WEIGHT: 185 LBS | DIASTOLIC BLOOD PRESSURE: 85 MMHG | HEIGHT: 71 IN | BODY MASS INDEX: 25.9 KG/M2 | HEART RATE: 100 BPM | TEMPERATURE: 98.1 F | SYSTOLIC BLOOD PRESSURE: 149 MMHG | RESPIRATION RATE: 18 BRPM

## 2019-02-20 DIAGNOSIS — C79.51 SECONDARY MALIGNANT NEOPLASM OF BONE: ICD-10-CM

## 2019-02-20 DIAGNOSIS — Z86.79 PERSONAL HISTORY OF OTHER DISEASES OF THE CIRCULATORY SYSTEM: ICD-10-CM

## 2019-02-20 DIAGNOSIS — Z78.9 OTHER SPECIFIED HEALTH STATUS: ICD-10-CM

## 2019-02-20 DIAGNOSIS — M84.479A: ICD-10-CM

## 2019-02-20 DIAGNOSIS — C34.92 MALIGNANT NEOPLASM OF UNSPECIFIED PART OF LEFT BRONCHUS OR LUNG: ICD-10-CM

## 2019-02-20 DIAGNOSIS — Z87.891 PERSONAL HISTORY OF NICOTINE DEPENDENCE: ICD-10-CM

## 2019-02-20 DIAGNOSIS — J90 PLEURAL EFFUSION, NOT ELSEWHERE CLASSIFIED: ICD-10-CM

## 2019-02-20 PROCEDURE — 99205 OFFICE O/P NEW HI 60 MIN: CPT

## 2019-02-21 PROBLEM — Z78.9 NO SIGNIFICANT FAMILY HISTORY: Status: ACTIVE | Noted: 2019-02-21

## 2019-02-21 PROBLEM — C79.51 METASTATIC ADENOCARCINOMA TO BONE: Status: ACTIVE | Noted: 2019-02-21

## 2019-02-21 PROBLEM — Z87.891 FORMER SMOKER: Status: ACTIVE | Noted: 2019-02-21

## 2019-02-21 PROBLEM — M84.479A METATARSAL FRACTURE, PATHOLOGIC: Status: ACTIVE | Noted: 2019-02-21

## 2019-02-21 PROBLEM — J90 PLEURAL EFFUSION: Status: ACTIVE | Noted: 2019-02-21

## 2019-02-21 PROBLEM — C34.92 ADENOCARCINOMA OF LEFT LUNG: Status: ACTIVE | Noted: 2019-02-21

## 2019-02-21 PROBLEM — Z86.79 HISTORY OF HYPERTENSION: Status: RESOLVED | Noted: 2019-02-21 | Resolved: 2019-02-21

## 2019-02-21 RX ORDER — BENAZEPRIL HYDROCHLORIDE AND HYDROCHLOROTHIAZIDE 20; 12.5 MG/1; MG/1
20-12.5 TABLET, FILM COATED ORAL DAILY
Refills: 0 | Status: ACTIVE | COMMUNITY

## 2019-02-21 RX ORDER — OSIMERTINIB 80 1/1
80 TABLET, FILM COATED ORAL DAILY
Refills: 0 | Status: ACTIVE | COMMUNITY

## 2019-02-22 ENCOUNTER — EMERGENCY (EMERGENCY)
Facility: HOSPITAL | Age: 79
LOS: 1 days | End: 2019-02-22
Attending: EMERGENCY MEDICINE
Payer: MEDICARE

## 2019-02-22 PROCEDURE — 99285 EMERGENCY DEPT VISIT HI MDM: CPT | Mod: 25

## 2019-02-22 PROCEDURE — 92950 HEART/LUNG RESUSCITATION CPR: CPT

## 2019-02-22 NOTE — ED ADULT NURSE REASSESSMENT NOTE - NS ED NURSE REASSESS COMMENT FT1
pt was biba from home in cardiac arrest, no rhythm, no shocks, PEA. refer to code sheet. pronounced dead at 2507

## 2019-02-22 NOTE — CONSULT LETTER
[Dear  ___] : Dear  [unfilled], [Courtesy Letter:] : I had the pleasure of seeing your patient, [unfilled], in my office today. [Please see my note below.] : Please see my note below. [Sincerely,] : Sincerely, [FreeTextEntry2] : Nina Ford MD

## 2019-02-22 NOTE — DATA REVIEWED
[FreeTextEntry1] : CT scan in 1/2019 revealed enlarging bone mets with sclerotic lesions in the spine, pelvis, right iliac creast and L2 region, with CHELLE lesion grew to 3.8 cm ( previously 3cm in 5/2018 and 2.9cm in 8/2017). Anew moderate left effusion. \par \par Bone scan on 1/14/19: activity in the right proximal humerus, left anterior rib, thoracolumbar spine and left mid shaft of the femur. \par

## 2019-02-22 NOTE — ED PROVIDER NOTE - PROGRESS NOTE DETAILS
Dw pts wife at bedside, in ed. Cachorro Tucker, will need to dw Dr Wheeler. Cachorro Jimenez, [phone] - states will hve Dr Wheeler sign death certif. Cachorro DOWD, Deceglia - will release case to family.

## 2019-02-22 NOTE — HISTORY OF PRESENT ILLNESS
[FreeTextEntry1] : 79 y/o male, former smoker, with Pmhx of HTN, lung cancer, is referred by oncologist Dr. Nina Ford for evaluation of pleural effusion. 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 He was started on Tarceva in 9/2017, but d/c later due to severe skin rash, currently on Tagrisson 80 mg QD started from 7/2018. \par \par CT scan in 1/2019 revealed enlarging bone mets with sclerotic lesions in the spine, pelvis, right iliac creast and L2 region, with CHELLE lesion grew to 3.8 cm ( previously 3cm in 5/2018 and 2.9cm in 8/2017). Anew moderate left effusion. \par Bone scan on 1/14/19: activity in the right proximal humerus, left anterior rib, thoracolumbar spine and left mid shaft of the femur. \par \par He was hospitalized on 1/28/19 for acute dyspnea, found to have enlarging and symptomatic left pleural effusion. He underwent left thoracentesis with cytology c/w malignancy. \par \par \par CT chest on 1/27/19: large left sided pleural effusion.\par He was found to have transverse non-displaced fx of the right humerus neck c/w with mets. \par \par He presents today for evaluation of pleural effusion. He admits SOB on exertion. The patient denies cough, chest pain, hemoptysis, palpitation, fever, recent illness, hospitalization and significant weight loss.\par \par \par

## 2019-02-22 NOTE — ED PROVIDER NOTE - ENMT, MLM
Airway patent, Nasal mucosa clear. Mouth with normal mucosa. Throat has no vesicles, no oropharyngeal exudates and uvula is midline. MM moist

## 2019-02-22 NOTE — PHYSICAL EXAM
[General Appearance - Alert] : alert [General Appearance - In No Acute Distress] : in no acute distress [Sclera] : the sclera and conjunctiva were normal [PERRL With Normal Accommodation] : pupils were equal in size, round, and reactive to light [Outer Ear] : the ears and nose were normal in appearance [Hearing Threshold Finger Rub Not [place]] : hearing was normal [Neck Appearance] : the appearance of the neck was normal [Respiration, Rhythm And Depth] : normal respiratory rhythm and effort [Auscultation Breath Sounds / Voice Sounds] : lungs were clear to auscultation bilaterally [Heart Rate And Rhythm] : heart rate was normal and rhythm regular [Heart Sounds] : normal S1 and S2 [Examination Of The Chest] : the chest was normal in appearance [2+] : left 2+ [No Abnormalities] : the abdominal aorta was not enlarged and no bruit was heard [No Pulse Delay] : no pulse delay [No Pulse Discrepancy] : no pulse discrepancy detected [Full Pulse] : peripheral pulses were full [Bowel Sounds] : normal bowel sounds [Abdomen Soft] : soft [Cervical Lymph Nodes Enlarged Posterior Bilaterally] : posterior cervical [Cervical Lymph Nodes Enlarged Anterior Bilaterally] : anterior cervical [No CVA Tenderness] : no ~M costovertebral angle tenderness [Abnormal Walk] : normal gait [Involuntary Movements] : no involuntary movements were seen [Skin Color & Pigmentation] : normal skin color and pigmentation [Skin Turgor] : normal skin turgor [] : no rash [No Focal Deficits] : no focal deficits [Oriented To Time, Place, And Person] : oriented to person, place, and time [Affect] : the affect was normal [Mood] : the mood was normal [Fingers] :  capillary refill of the fingers was normal [Varicose Veins Of The Right Leg] : the patient has no varicose veins of the right leg [Varicose Veins Of The Left Leg] : the patient has no varicose veins of the left leg [FreeTextEntry1] : deferred

## 2019-02-22 NOTE — ASSESSMENT
[FreeTextEntry1] : 77 y/o male, former smoker, with Pmhx of HTN, lung cancer, is referred by oncologist Dr. Nina Ford for evaluation of pleural effusion. 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 He was started on Tarceva in 9/2017, but d/c later due to severe skin rash, currently on Tagrisson 80 mg QD started from 7/2018. \par \par CT scan in 1/2019 revealed enlarging bone mets with sclerotic lesions in the spine, pelvis, right iliac creast and L2 region, with CHELLE lesion grew to 3.8 cm ( previously 3cm in 5/2018 and 2.9cm in 8/2017). A new moderate left effusion. \par Bone scan on 1/14/19: activity in the right proximal humerus, left anterior rib, thoracolumbar spine and left mid shaft of the femur. \par \par CT chest on 1/27/19: large left sided pleural effusion. \par He was hospitalized on 1/28/19 for acute dyspnea, found to have enlarging and symptomatic left pleural effusion. He underwent left thoracentesis with cytology c/w malignancy. \par \par He was found to have transverse non-displaced fx of the right humerus neck c/w with mets. \par \par I have reviewed the patient's medical records and diagnostic images at the time of this office consultation and have made the following recommendation.\par Plan:\par 1. Left VATS, drainage of left pleural effusion, Pleurx catheter placement on 2/26/19.  All risks vs. benefits and alternatives were explained to the patient, all questions were answered, patient verbalized understanding, was in agreement with the plan to proceed.\par 2. Medical clearance. \par 3. PST. \par \par \par \par \par Written by Alexandra Vargas NP, acting as a scribe for Dr. Roberto Noland.       \par “The documentation recorded by the scribe accurately reflects the service I personally performed and the decisions made by me.”   Roberto Noland MD.\par \par \par \par

## 2019-02-26 ENCOUNTER — APPOINTMENT (OUTPATIENT)
Dept: THORACIC SURGERY | Facility: HOSPITAL | Age: 79
End: 2019-02-26

## 2019-02-27 LAB
CULTURE RESULTS: SIGNIFICANT CHANGE UP
SPECIMEN SOURCE: SIGNIFICANT CHANGE UP

## 2019-03-20 LAB
CULTURE RESULTS: SIGNIFICANT CHANGE UP
SPECIMEN SOURCE: SIGNIFICANT CHANGE UP

## 2019-12-23 NOTE — ED PROVIDER NOTE - WILL SEE PATIENT
Patients daughter called regarding antibiotic therapy. She states that she was not aware that her mother needed to complete the Azithromycin along with the omnicef. She states that her mother is feeling better today and that she will add the antibiotic this afternoon to her medications.    today

## 2021-04-11 NOTE — ED ADULT TRIAGE NOTE - NS ED TRIAGE AVPU SCALE
No restrictions  
Alert-The patient is alert, awake and responds to voice. The patient is oriented to time, place, and person. The triage nurse is able to obtain subjective information.

## 2022-03-17 NOTE — ED PROVIDER NOTE - OBJECTIVE STATEMENT
77 yo M p/w was co SOB this am. Wife called EMS. Upon arrival of EMS, pt was found in cardiac arrest. Pt was asystolic, no shocks. on / off PEA. no trauma. No fever/chills. pt with recent admission for malignant pleural eff. No other hx avail
room air

## 2022-06-17 NOTE — H&P ADULT - NS NEC GEN PE MLT EXAM PC
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 Pt reported his girlfriend manages cooking, cleaning, laundry, grocery shopping. Pt not actively driving but was able to prior to 2022.    ST Dx: [] Aphasia  [x] Dysarthria  [] Apraxia   [x] Oropharyngeal dysphagia [x] Cognitive Impairment  [] Other:   Date of Admit: 2022  Room #: 0156/0156-01    Current functional status (updated daily):         Pt being seen for : [x] Speech/Language Treatment  [x] Dysphagia Treatment [x] Cognitive Treatment  [] Other:  Communication: []WFL  [] Aphasia  [x] Dysarthria  [] Apraxia  [] Pragmatic Impairment [] Non-verbal  [] Hearing Loss  [] Other:   Cognition: [] WFL  [] Mild  [x] Moderate  [] Severe [] Unable to Assess  [] Other:  Memory: [] WFL  [] Mild  [x] Moderate  [] Severe [] Unable to Assess  [] Other:  Behavior: [x] Alert  [x] Cooperative  [x]  Pleasant  [] Confused  [] Agitated  [] Uncooperative  [] Distractible [] Motivated  [] Self-Limiting [] Anxious  [] Other:  Endurance:  [x] Adequate for participation in SLP sessions  [] Reduced overall  [] Lethargic  [] Other:  Safety: [] No concerns at this time  [x] Reduced insight into deficits  [x]  Reduced safety awareness [] Not following call light procedures  [] Unable to Assess  [] Other:    Current Diet Order:Diet NPO  ADULT TUBE FEEDING; PEG; Standard with Fiber; Bolus; 5 Times Daily; 237; 60; Before and after each bolus; Protein; 1 bottle of Proteinex daily, 30 mL free water flush before and after adminstration  Swallowing Precautions: oral care 2-3x/day to reduce adverse affects in the event of aspiration        Date: 6/17/2022      Tx session 1  0900 - 1000 Tx session 2  All tx needs met in session 1   Total Timed Code Min 0 0   Total Treatment Minutes 60 0   Individual Treatment Minutes 60 0   Group Treatment Minutes 0 0   Co-Treat Minutes 0 0   Variance/Reason:  n/a    Pain Buttocks wound     Pain Intervention [x] RN notified  [x] Repositioned  [] Intervention offered and patient declined  [] N/A  [x] Other: RN provided pain meds via PEG [] RN notified  [] Repositioned  [] Intervention offered and patient declined  [] N/A  [] Other:   Subjective     Pt alert and cooperative, agreeable to tx. Pt upright in bed for session. Objective:  Goals  Timeframe for Short-term Goals: 18 days (06/26/22)     Dysphagia Goals:  Goal 1: Pt will complete pharyngeal/laryngeal strengthening exercises 10/10 given min cues for overall improved swallowing function    Pt completed the following pharyngeal / laryngeal strengthening exercises:  -shaker maneuver - held for 1 min x2, then completed 30 reps   -effortful swallow: 10x  -darwin maneuver: 10x  -isometric tongue tip press and hold 5x: 10x  -jaw jut: x10 - max cues, significant difficulty completing task (reduced ROM and coordination - likely related to radiation treatment)       Goal 2: The patient will tolerate instrumental swallowing procedure MBSS scheduled for Monday, 06/20/22 at 0830. Pt and RN made aware.      Goal 3: The patient/caregiver will demonstrate understanding of compensatory strategies for improved swallowing safety   SLP edu pt re: safe swallow strategies - fully upright, not talking with PO in mouth, hard swallow, multiple swallows     Goal 4: The patient will tolerate recommended diet without observed clinical signs of aspiration   Pt accepting of the following PO trials this date:  -pt note, pt with significantly reduced laryngeal elevation, suspect rapid spillover BOT and delayed swallow trigger  -occ anterior bolus loss on left side     - Ice chips x5: no overt s/s of aspiration   -thin 1/4 tsp: 5x: 2 swallows per sip, no overt s/s of aspiration.    -thin 1/2 tsp: 5x: 2-3 swallows per sip, no overt s/s of aspiration     -puree 1/4 tsp x1: 3 swallows; wet cough post-swallow     -moderately (honey) thick liquids: x3 1/4 tsp: 3 swallows per sip; no overt s/s of aspiration         Cognitive-linguistic Goals  Goal 1: Pt will complete recall tasks with 80% acc given min cues for use of compensatory strategies   Goal not directly targeted. Goal 2: Pt will complete executive function tasks (meds, math, money, time, etc) with 80% acc given min cues. Goal not directly targeted. Goal 3: Pt will complete problem solving and thought organization tasks with 80% acc given min cues. Goal not directly targeted. Goal 4: Pt will complete verbal and visual reasoning tasks with 80% acc given min cues. Goal not directly targeted. Goal 5: Pt will repeat words/phrases/sentences using speech intelligibility strategies with 80% acc given min cues. SLP edu pt re: SLOB speech strategies - slow, loud, over-exaggerate, breath support. 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 Pt more receptive of sitting fully upright for PO intake this date.  Pt given the following PO trials - ice chips, thin (1/4 tsp and 1/2 tsp), puree, and moderately (honey) thick 1/4 tsp. Pt only with overt / rosey aspiration characterized by wet vocal quality with puree. Pt is at a high risk of silent aspiration, thus difficult to determine at bedside. Suspect significantly reduced laryngeal elevation, delayed trigger of pharyngeal swallow. The amount of swallows required for a PO trial increases with thicker consistencies (e.g. more swallows with puree / HTL vs thin). Pt completed pharyngeal / laryngeal strengthening exercises; edu to complete this weekend. Edu re: SLOB speech strategies. Pt did well repeating 2 and 3 syllable words from SLP, but pt has significant difficulty completing velar (back) sounds for /k/ and /g/ likely related to reduced lingual ROM and BOT retraction. MBSS scheduled for Monday at 0830. Continue goals above. Plan: Continue as per plan of care. Additional Information:     Barriers toward progress: Cognitive deficit, Impulsivity, Limited safety awareness, Limited insight into deficits, Unrealistic expectations and Medication managment  Discharge recommendations:  [] Home independently  [] Home with assistance [x]  24 hour supervision  [] ECF [] Other:  Continued Tx Upon Discharge: ? [x] Yes [] No [] TBD based on progress while on ARU [] Vital Stim indicated [] Other:   Estimated discharge date: 06/25/2022    Interventions used this date:  [] Speech/Language Treatment  [] Instruction in HEP [] Group [x] Dysphagia Treatment [x] Cognitive Treatment   [] Other:       Total Time Breakdown / Charges    Time In Time out Total Time / units   Cognitive Tx      Speech Tx 0945 1000 15 min / 1 unit   Dysphagia Tx 0900 0945 45 min / 1 unit       Electronically Signed by     Silvia Clay [address] [place] [address] [id number]  Speech Language Pathologist No bruits; no thyromegaly or nodules

## 2023-11-16 NOTE — ED PROVIDER NOTE - RESPIRATORY, MLM
Have Your Skin Lesions Been Treated?: not been treated Is This A New Presentation, Or A Follow-Up?: Skin Lesions How Severe Is Your Skin Lesion?: moderate Bl BS by BVM after adjustment ett by Resp, no spont resp effort.

## 2023-11-27 NOTE — BRIEF OPERATIVE NOTE - SPECIMENS
IMDUR (ISOSORBIDE MONONITRATE)    OFFICE VISIT  Last office visit: 08/14/23,   • per this office note patient is to continue the requested medication at the current dose & was advised to follow up in 6 months    Next  office visit: 02/14/24 with Dr. Dominic King    Is the patient pregnant: NO    Does patient have an appointment within the last 12 months or upcoming 3 months:  YES    Recent Labs   Lab 11/12/23  1738   Creatinine 1.16      Creatinine level in the last 12 months when taking any daily antianginal medication: YES    Medication was:Refilled per protocol.       
marcelle espinoza

## 2024-09-25 NOTE — PROGRESS NOTE ADULT - PROBLEM SELECTOR PROBLEM 4
Goal Outcome Evaluation:           Problem: Adult Inpatient Plan of Care  Goal: Plan of Care Review  Outcome: Progressing  Goal: Patient-Specific Goal (Individualized)  Outcome: Progressing  Goal: Absence of Hospital-Acquired Illness or Injury  Outcome: Progressing  Intervention: Identify and Manage Fall Risk  Recent Flowsheet Documentation  Taken 9/25/2024 0600 by Aleah Veliz RN  Safety Promotion/Fall Prevention:   safety round/check completed   room organization consistent  Taken 9/25/2024 0400 by Aleah Veliz RN  Safety Promotion/Fall Prevention:   safety round/check completed   room organization consistent  Taken 9/25/2024 0200 by Aleah Veliz RN  Safety Promotion/Fall Prevention:   safety round/check completed   room organization consistent  Taken 9/25/2024 0000 by Aleah Veliz RN  Safety Promotion/Fall Prevention:   safety round/check completed   room organization consistent  Taken 9/24/2024 2200 by Aleah Veliz RN  Safety Promotion/Fall Prevention:   safety round/check completed   room organization consistent  Taken 9/24/2024 2000 by Aleah Veliz RN  Safety Promotion/Fall Prevention:   safety round/check completed   room organization consistent  Intervention: Prevent Skin Injury  Recent Flowsheet Documentation  Taken 9/25/2024 0400 by Aleah Veliz RN  Skin Protection:   adhesive use limited   incontinence pads utilized  Taken 9/25/2024 0000 by Aleah Veliz RN  Skin Protection:   adhesive use limited   tubing/devices free from skin contact   incontinence pads utilized  Taken 9/24/2024 2000 by Aleah Veliz RN  Skin Protection:   adhesive use limited   tubing/devices free from skin contact   pulse oximeter probe site changed   incontinence pads utilized  Intervention: Prevent and Manage VTE (Venous Thromboembolism) Risk  Recent Flowsheet Documentation  Taken 9/25/2024 0400 by Aleah Veliz RN  VTE Prevention/Management:   sequential compression devices off   patient refused intervention  Range of  Motion: active ROM (range of motion) encouraged  Taken 9/25/2024 0000 by Aleah Veliz RN  Range of Motion: active ROM (range of motion) encouraged  Taken 9/24/2024 2000 by Aleah Veliz RN  VTE Prevention/Management:   bilateral   compression stockings off   patient refused intervention  Range of Motion: active ROM (range of motion) encouraged  Intervention: Prevent Infection  Recent Flowsheet Documentation  Taken 9/25/2024 0200 by Aleah Veliz RN  Infection Prevention:   single patient room provided   personal protective equipment utilized   rest/sleep promoted   hand hygiene promoted  Taken 9/25/2024 0000 by Aleah Veliz RN  Infection Prevention:   single patient room provided   rest/sleep promoted   personal protective equipment utilized   hand hygiene promoted  Taken 9/24/2024 2200 by Aleah Veliz RN  Infection Prevention:   single patient room provided   rest/sleep promoted   hand hygiene promoted   personal protective equipment utilized  Taken 9/24/2024 2000 by Aleah Veliz RN  Infection Prevention:   rest/sleep promoted   single patient room provided   hand hygiene promoted   personal protective equipment utilized  Goal: Optimal Comfort and Wellbeing  Outcome: Progressing  Intervention: Monitor Pain and Promote Comfort  Recent Flowsheet Documentation  Taken 9/25/2024 0157 by Aleah Veliz RN  Pain Management Interventions: see MAR  Intervention: Provide Person-Centered Care  Recent Flowsheet Documentation  Taken 9/25/2024 0400 by Aleah Veliz RN  Trust Relationship/Rapport:   care explained   choices provided   emotional support provided  Taken 9/25/2024 0000 by Aleah Veliz RN  Trust Relationship/Rapport:   care explained   choices provided   emotional support provided   questions answered   questions encouraged  Taken 9/24/2024 2000 by Aleah Veliz RN  Trust Relationship/Rapport:   care explained   choices provided   emotional support provided   questions answered   questions encouraged  Goal:  Readiness for Transition of Care  Outcome: Progressing  Goal: Plan of Care Review  Outcome: Progressing  Goal: Patient-Specific Goal (Individualized)  Outcome: Progressing  Goal: Absence of Hospital-Acquired Illness or Injury  Outcome: Progressing  Intervention: Identify and Manage Fall Risk  Recent Flowsheet Documentation  Taken 9/25/2024 0600 by Aleah Veliz RN  Safety Promotion/Fall Prevention:   safety round/check completed   room organization consistent  Taken 9/25/2024 0400 by Aleah Veliz RN  Safety Promotion/Fall Prevention:   safety round/check completed   room organization consistent  Taken 9/25/2024 0200 by Aleah Veliz RN  Safety Promotion/Fall Prevention:   safety round/check completed   room organization consistent  Taken 9/25/2024 0000 by Aleah Veliz RN  Safety Promotion/Fall Prevention:   safety round/check completed   room organization consistent  Taken 9/24/2024 2200 by Aleah Veliz RN  Safety Promotion/Fall Prevention:   safety round/check completed   room organization consistent  Taken 9/24/2024 2000 by Aleah Veliz RN  Safety Promotion/Fall Prevention:   safety round/check completed   room organization consistent  Intervention: Prevent Skin Injury  Recent Flowsheet Documentation  Taken 9/25/2024 0400 by Aleah Veliz RN  Skin Protection:   adhesive use limited   incontinence pads utilized  Taken 9/25/2024 0000 by Aleah Veliz RN  Skin Protection:   adhesive use limited   tubing/devices free from skin contact   incontinence pads utilized  Taken 9/24/2024 2000 by Aleah Veliz RN  Skin Protection:   adhesive use limited   tubing/devices free from skin contact   pulse oximeter probe site changed   incontinence pads utilized  Intervention: Prevent and Manage VTE (Venous Thromboembolism) Risk  Recent Flowsheet Documentation  Taken 9/25/2024 0400 by Aleah Veliz RN  VTE Prevention/Management:   sequential compression devices off   patient refused intervention  Range of Motion: active ROM  (range of motion) encouraged  Taken 9/25/2024 0000 by Aleah Veliz RN  Range of Motion: active ROM (range of motion) encouraged  Taken 9/24/2024 2000 by Aleah Veliz RN  VTE Prevention/Management:   bilateral   compression stockings off   patient refused intervention  Range of Motion: active ROM (range of motion) encouraged  Intervention: Prevent Infection  Recent Flowsheet Documentation  Taken 9/25/2024 0200 by Aleah Veliz RN  Infection Prevention:   single patient room provided   personal protective equipment utilized   rest/sleep promoted   hand hygiene promoted  Taken 9/25/2024 0000 by Aleah Veliz RN  Infection Prevention:   single patient room provided   rest/sleep promoted   personal protective equipment utilized   hand hygiene promoted  Taken 9/24/2024 2200 by Aleah Veliz RN  Infection Prevention:   single patient room provided   rest/sleep promoted   hand hygiene promoted   personal protective equipment utilized  Taken 9/24/2024 2000 by Aleah Veliz RN  Infection Prevention:   rest/sleep promoted   single patient room provided   hand hygiene promoted   personal protective equipment utilized  Goal: Optimal Comfort and Wellbeing  Outcome: Progressing  Intervention: Monitor Pain and Promote Comfort  Recent Flowsheet Documentation  Taken 9/25/2024 0157 by Aleah Veliz RN  Pain Management Interventions: see MAR  Intervention: Provide Person-Centered Care  Recent Flowsheet Documentation  Taken 9/25/2024 0400 by Aleah Veliz RN  Trust Relationship/Rapport:   care explained   choices provided   emotional support provided  Taken 9/25/2024 0000 by Aleah Veliz RN  Trust Relationship/Rapport:   care explained   choices provided   emotional support provided   questions answered   questions encouraged  Taken 9/24/2024 2000 by Aleah Veliz RN  Trust Relationship/Rapport:   care explained   choices provided   emotional support provided   questions answered   questions encouraged  Goal: Readiness for Transition  of Care  Outcome: Progressing     Problem: Acute Confusion (Hospitalized Older Adult)  Goal: Optimal Cognitive Function  Outcome: Progressing  Intervention: Minimize Contributing Factors  Recent Flowsheet Documentation  Taken 9/25/2024 0400 by Aleah Veliz RN  Reorientation Measures: clock in view  Taken 9/25/2024 0000 by Aleah Veliz RN  Sensory Stimulation Regulation: care clustered  Reorientation Measures: clock in view  Taken 9/24/2024 2000 by Aleah Veliz RN  Sensory Stimulation Regulation:   lighting decreased   care clustered  Reorientation Measures: clock in view     Problem: Bowel Elimination Impaired (Hospitalized Older Adult)  Goal: Effective Bowel Elimination  Outcome: Progressing     Problem: Depression (Hospitalized Older Adult)  Goal: Depressive Symptoms Identified and Managed  Outcome: Progressing  Intervention: Monitor and Manage Depressive Symptoms  Recent Flowsheet Documentation  Taken 9/25/2024 0400 by Aleah Veliz RN  Supportive Measures: active listening utilized  Family/Support System Care:   support provided   self-care encouraged   presence promoted   involvement promoted  Taken 9/25/2024 0000 by Aleah Veliz RN  Supportive Measures: active listening utilized  Family/Support System Care:   support provided   self-care encouraged   presence promoted   involvement promoted  Taken 9/24/2024 2000 by Aleah Veliz RN  Supportive Measures: active listening utilized  Family/Support System Care: support provided     Problem: Fluid Imbalance (Hospitalized Older Adult)  Goal: Fluid Balance  Outcome: Progressing  Intervention: Monitor and Manage Fluid Balance  Recent Flowsheet Documentation  Taken 9/25/2024 0400 by Aleah Veliz RN  Fluid/Electrolyte Management: fluids provided  Taken 9/24/2024 2000 by Aleah Veliz RN  Fluid/Electrolyte Management: fluids provided     Problem: Functional Ability Impaired (Hospitalized Older Adult)  Goal: Optimal Functional Ability  Outcome: Progressing      Problem: Oral Intake Inadequate (Hospitalized Older Adult)  Goal: Optimal Nutrition Intake  Outcome: Progressing  Intervention: Promote and Optimize Oral Intake  Recent Flowsheet Documentation  Taken 9/24/2024 2000 by Aleah Veliz RN  Oral Nutrition Promotion: rest periods promoted     Problem: Sleep Disturbance (Hospitalized Older Adult)  Goal: Adequate Sleep/Rest  Outcome: Progressing  Intervention: Promote Sleep/Rest  Recent Flowsheet Documentation  Taken 9/25/2024 0400 by Aleah Veliz RN  Sleep/Rest Enhancement:   awakenings minimized   consistent schedule promoted  Taken 9/25/2024 0000 by Aleah Veliz RN  Sleep/Rest Enhancement:   consistent schedule promoted   awakenings minimized  Taken 9/24/2024 2000 by Aleah Veliz RN  Sleep/Rest Enhancement: consistent schedule promoted     Problem: Urinary Incontinence (Hospitalized Older Adult)  Goal: Urinary Incontinence Managed  Outcome: Progressing  Intervention: Promote Urinary Continence  Recent Flowsheet Documentation  Taken 9/25/2024 0400 by Aleah Veliz RN  Urinary Elimination Promotion: catheter patency maintained  Taken 9/25/2024 0000 by Aleah Veliz RN  Urinary Elimination Promotion:   absorbent pad/diaper use encouraged   catheter patency maintained  Taken 9/24/2024 2000 by Aleah Veliz RN  Urinary Elimination Promotion:   absorbent pad/diaper use encouraged   catheter patency maintained     Problem: Skin Injury Risk Increased  Goal: Skin Health and Integrity  Outcome: Progressing  Intervention: Promote and Optimize Oral Intake  Recent Flowsheet Documentation  Taken 9/24/2024 2000 by Aleah Veliz RN  Oral Nutrition Promotion: rest periods promoted  Intervention: Optimize Skin Protection  Recent Flowsheet Documentation  Taken 9/25/2024 0400 by Aleah Veliz RN  Pressure Reduction Techniques:   frequent weight shift encouraged   sit time limited to 2 hours   weight shift assistance provided  Pressure Reduction Devices:   positioning supports  utilized   pressure-redistributing mattress utilized   heel offloading device utilized  Skin Protection:   adhesive use limited   incontinence pads utilized  Taken 9/25/2024 0000 by Aleah Veliz RN  Pressure Reduction Techniques:   frequent weight shift encouraged   sit time limited to 2 hours   weight shift assistance provided  Pressure Reduction Devices:   heel offloading device utilized   positioning supports utilized   pressure-redistributing mattress utilized  Skin Protection:   adhesive use limited   tubing/devices free from skin contact   incontinence pads utilized  Taken 9/24/2024 2000 by Aleah Veliz RN  Pressure Reduction Techniques:   frequent weight shift encouraged   sit time limited to 2 hours   weight shift assistance provided  Pressure Reduction Devices:   pressure-redistributing mattress utilized   positioning supports utilized   heel offloading device utilized  Skin Protection:   adhesive use limited   tubing/devices free from skin contact   pulse oximeter probe site changed   incontinence pads utilized     Problem: Fall Injury Risk  Goal: Absence of Fall and Fall-Related Injury  Outcome: Progressing  Intervention: Identify and Manage Contributors  Recent Flowsheet Documentation  Taken 9/25/2024 0200 by Aleah Veliz RN  Medication Review/Management: medications reviewed  Taken 9/25/2024 0000 by Aleah Veliz RN  Medication Review/Management: medications reviewed  Taken 9/24/2024 2200 by Aleah Veliz RN  Medication Review/Management: medications reviewed  Taken 9/24/2024 2000 by Aleah Veliz RN  Medication Review/Management: medications reviewed  Intervention: Promote Injury-Free Environment  Recent Flowsheet Documentation  Taken 9/25/2024 0600 by Aleah Veliz RN  Safety Promotion/Fall Prevention:   safety round/check completed   room organization consistent  Taken 9/25/2024 0400 by Aelah Veliz RN  Safety Promotion/Fall Prevention:   safety round/check completed   room organization  consistent  Taken 9/25/2024 0200 by Aleah Veliz RN  Safety Promotion/Fall Prevention:   safety round/check completed   room organization consistent  Taken 9/25/2024 0000 by Aleah Veliz RN  Safety Promotion/Fall Prevention:   safety round/check completed   room organization consistent  Taken 9/24/2024 2200 by Aleah Veliz RN  Safety Promotion/Fall Prevention:   safety round/check completed   room organization consistent  Taken 9/24/2024 2000 by Aleah Veliz RN  Safety Promotion/Fall Prevention:   safety round/check completed   room organization consistent                                         BPH (benign prostatic hyperplasia)